# Patient Record
Sex: FEMALE | Race: BLACK OR AFRICAN AMERICAN | Employment: FULL TIME | URBAN - METROPOLITAN AREA
[De-identification: names, ages, dates, MRNs, and addresses within clinical notes are randomized per-mention and may not be internally consistent; named-entity substitution may affect disease eponyms.]

---

## 2023-07-18 ENCOUNTER — OFFICE VISIT (OUTPATIENT)
Dept: PERINATAL CARE | Facility: HOSPITAL | Age: 38
End: 2023-07-18
Attending: OBSTETRICS & GYNECOLOGY
Payer: COMMERCIAL

## 2023-07-18 VITALS — DIASTOLIC BLOOD PRESSURE: 87 MMHG | SYSTOLIC BLOOD PRESSURE: 123 MMHG | HEART RATE: 64 BPM

## 2023-07-18 DIAGNOSIS — N88.3 INCOMPETENT CERVIX: Primary | ICD-10-CM

## 2023-07-18 DIAGNOSIS — Z31.69 ENCOUNTER FOR PRECONCEPTION CONSULTATION: ICD-10-CM

## 2023-07-18 RX ORDER — CHOLECALCIFEROL (VITAMIN D3) 25 MCG
1 TABLET,CHEWABLE ORAL DAILY
COMMUNITY

## 2023-07-18 NOTE — PROGRESS NOTES
Outpatient Maternal-Fetal Medicine Consultation    Dear Dr. Anabell Suazo,    Thank you for requesting maternal fetal medicine consultation on your patient Linda Andres. As you are aware she is a 41year old female with a history of fetal loss due to incompetent cervix and her most recent pregnancy. A maternal-fetal medicine consultation was requested secondary to her poor OB history. Her prenatal records and labs were reviewed. HISTORY  OB History     T0    L0    SAB2  IAB2  Ectopic0  Multiple0  Live Births1   # 1 - Date: 03, Sex: None, Weight: None, GA: 7w0d, Delivery: Induced , Apgar1: None, Apgar5: None, Living: None, Birth Comments: Medical termination of pregnancy due to hyperemesis    # 2 - Date: 08, Sex: None, Weight: None, GA: 8w0d, Delivery: Induced , Apgar1: None, Apgar5: None, Living: None, Birth Comments: Medical termination of pregnancy due to hyperemesis    # 3 - Date: 2013, Sex: None, Weight: None, GA: 12w0d, Delivery: Spontaneous , Delene Tyrrell: None, Apgar5: None, Living: None, Birth Comments: None    # 4 - Date: 21, Sex: None, Weight: None, GA: 9w0d, Delivery: None, Apgar1: None, Apgar5: None, Living: None, Birth Comments: Missed  with a crown-rump length of 8 weeks. Diagnosed at 9 weeks 5 days. # 5 - Date: 23, Sex: Female, Weight: None, GA: 21w6d, Delivery: Normal spontaneous vaginal delivery, Apgar1: None, Apgar5: None, Living:  Demise, Birth Comments: Incompetent cervix    # 6 - Date: None, Sex: None, Weight: None, GA: None, Delivery: None, Apgar1: None, Apgar5: None, Living: None, Birth Comments: None    Past Medical History  The patient  has a past medical history of BMI 30.0-30.9,adult. Past Surgical History  The patient  has a past surgical history that includes d&c after delivery (2023).  - D&C with missed AB    Family History  The patient She indicated that the status of her mother is unknown.  She indicated that her maternal aunt is alive. Medications:   Current Outpatient Medications:     prenatal vitamin with DHA 27-0.8-228 MG Oral Cap, Take 1 capsule by mouth daily. , Disp: , Rfl:     Calcium 250 MG Oral Cap, Take by mouth., Disp: , Rfl:     Cholecalciferol 125 MCG (5000 UT) Oral Tab, Take 1 tablet (5,000 Units total) by mouth daily. , Disp: , Rfl:   Allergies: No Known Allergies      PHYSICAL EXAMINATION:  /87 (BP Location: Right arm, Patient Position: Sitting, Cuff Size: large)   Pulse 64   General: alert and oriented,no acute distress      DISCUSSION  During her visit we discussed and reviewed the following issues:  [facility]  History  She had a history of pregnancy termination x2 for severe hyperemesis. She also had a history of a missed AB around 9 weeks gestation. She reports a history of a home delivery after about 3 months gestation. 23 -vaginal delivery of her daughter Lori Flower who was born at 24+ week.  demise. She had seen [facility] in that pregnancy for level 2 ultrasound on 3/20/23. The fetal anatomy was normal but it was noted that she had a short funneling cervix with debris noted in the fundal.  The measurable cervical length was 9 mm. She was started on vaginal Crinone 90 mg nightly. She was evaluated in labor and delivery on 2023 complaining of contractions. It was reported that there were no contractions on the monitor and her sterile vaginal exam was fingertip thick and -3 station they are short for she was discharged home. Later that night she presented with contractions and vaginal bleeding and was admitted. Her cervical exam at that time was a bulging bag and they were not able to reach behind the back to feel the cervical dilatation. Bedside ultrasound was performed did not find that the fetus was in a footling breech position with hourglassing membranes and the internal os was dilated 1.43 cm.   She was kenn every 2 to 3 minutes and was treated with expectant management based on her gestational age and eminent delivery. Prior in the pregnancy she had had significant hyperemesis and had multiple hospitalization and IV infusions. Discussion:  Discussed with the patient that her presentation with the short funneling cervix and later progression into labor is consistent with an incompetent cervix. We discussed different treatment methods for incompetent cervix. Specifically we discussed prophylactic vaginal Bell cerclage between 12 and 14 weeks, serial cervical length ultrasound and ultrasound indicated placement of cervical cerclage, and we discussed prepregnancy placement of an abdominal cerclage. We discussed success rates of each of these treatment plans as well as risks. Cervical cerclage refers to a variety of surgical procedures in which sutures, wires, or synthetic tape are used to reinforce the cervix. These procedures are intended to mechanically increase the tensile strength of the cervix, thereby reducing the occurrence of adverse  events associated with cervical insufficiency (ie, relatively painless cervical changes that occur in the second trimester and result in recurrent pregnancy loss). Not all women are good candidates for cervical cerclage; the major contraindications are fetal anomaly incompatible with life, intrauterine infection, active bleeding, active  labor, premature rupture of membranes, and fetal demise. The presence of fetal membranes prolapsing through the external cervical os is a relative contraindication to the procedure because the risk of iatrogenic rupture of the membranes in this setting may exceed 50 percent.   Several techniques (eg, Trendelenburg position, back-filling the maternal bladder, decompression amniocentesis) have been used to make the fetal membranes retract prior to cerclage placement, with variable results     The lower and upper limits of gestational age for performing the procedure are not well defined. Waiting at least until the end of the first trimester also permits evaluation for some fetal anomalies, first trimester aneuploidy screening, or chorionic villus sampling prior to the procedure, if indicated. Most cerclages are placed via a vaginal approach. The transabdominal approach is more invasive, but allows higher placement since the transabdominal cerclage can be placed at the cervicoisthmic portion of the uterus, while transvaginal cerclages often end up distal to the internal os. In either case, the object is to reinforce the cervix at the level of the internal os. Lengthening of the cervix is a secondary effect. The length of cervix distal to the cerclage (ie, cerclage to external os) may not be an important factor in determining subsequent pregnancy outcome, but the length of closed cervix proximal to the cerclage appears to be predictive: a length ? 10 mm is desirable     Cervical cerclage is the conventional treatment for cervical insufficiency, despite the paucity of data from randomized trials proving its efficacy. Most case series report a viable birth rate of 70 to 90 percent after elective cerclage, as compared with 10 to 30 percent prior to the procedure. However, using patients as their own controls (ie, pregnancy success rate is compared before and after cerclage) is subject to bias since changes in the patient and her management other than cerclage may have accounted for the higher rate of success in the subsequent pregnancy. In fact, trials in which women were randomly assigned to undergo cerclage or no cerclage report much higher live birth rates in the untreated group than observed in historic controls. The timing of cerclage also affects outcome. There is universal agreement that emergency cerclage performed in the presence of advanced cervical changes and prolapsed membranes has the worst outcome.  It should be appreciated that there is significant risk that an emergency procedure may convert a previable birth to a very low birthweight premature birth (25 to 32 weeks) with the potential for serious long-term neurodevelopmental disability. Transabdominal placement of a cerclage at the cervicoisthmic junction appears to be a safe and effective procedure for reducing the incidence of spontaneous pregnancy loss in selected patients with cervical insufficiency. Potential advantages of transabdominal over transvaginal cerclage are more proximal placement of the stitch (at the level of the internal os), decreased risk of suture migration, absence of a foreign body in the vagina that could promote infection, and the ability to leave the suture in place for future pregnancies. A disadvantage of this approach is the potential need for two laparotomies during pregnancy (one to place the cerclage and potentially another to remove it). There are no studies comparing the outcome of transabdominal and transvaginal cerclage in similar populations of patients. Transabdominal cerclage is a more morbid procedure than transvaginal cerclage. It usually requires a laparotomy for placement and delivery by . For these reasons, most experts recommend reserving the transabdominal approach for women with cervical insufficiency who have either failed two or more previous transvaginal cerclages or in whom a transvaginal cerclage is technically impossible to perform due to extreme shortening, scarring, or laceration of the cervix. Whether to remove the cerclage if PPROM occurs is a matter of debate. One concern is that removal of the cerclage will lead to earlier delivery; however, retention of the foreign body may increase the risk of infectious morbidity. Several observational studies have addressed the management of such patients, with inconsistent results. The gestational age at PPROM was the most important determinant of  outcome.  Based on the available, limited data and our own clinical experience, we remove the cerclage in women with PPROM if (1) there is any evidence of chorioamnionitis or (2) the pregnancy is at least 32 weeks of gestation. In the absence of clinically apparent infection, we feel the possible increased risk of premature delivery with cerclage removal before 32 weeks outweighs the possible increased risk of ascending infection if the cerclage is left in place. After our discussion Cindy call me that she is leaning towards having a vaginal cerclage placed after the first trimester is complete. ADVANCED MATERNAL AGE    Background  I reviewed with the patient that pregnancies in women of advanced maternal age (28 or older at delivery) are associated with elevated risks. Specifically, there is a higher rate of:  Fetal malformations  Preeclampsia  Gestational diabetes  Intrauterine fetal death    As a result, enhanced pregnancy surveillance is advised for these patients including a comprehensive ultrasound to assess for fetal malformations (at 20 weeks) and a third trimester ultrasound assessment for fetal growth (at 32 weeks). In addition, weekly NST's (initiating at 36 weeks gestation for women 35-39 years and at 28 weeks gestation for women 40 years and older) are also advised. Routine obstetric care is more than adequate to assess for gestational diabetes and preeclampsia; hence, no further significant alterations in obstetric care are advised. Medical Complications    Women 28years of age or older can expect to experience two to three fold higher rates of hospitalization,  delivery, and pregnancy-related complications when compared to their younger counterparts. The two most common medical problems complicating these  pregnanccies are hypertension and diabetes.    The incidence of preeclampsia in the general obstetric population is 3 to 4 percent; this increases to 5 to 10 percent in women over age 36 and is as high as 28 percent in women over age 48. The incidence of gestational diabetes in the general obstetric population is 3 percent, rising to 7 to 15 percent in women over age 36 and 21 percent in women over age 48. Women 28years of age or older are more likely to be delivered by . The  delivery rate in the general obstetric population of the [facility] is almost 30 percent, compared to almost 48 percent in women over age 36 to 39 and almost [de-identified] percent in women age 48 to 61. Fetal Death        A decision analysis tool using data from the [employer]  [employer] predicted a strategy of weekly antepartum testing and labor induction would lower the risk of unexplained fetal death in women 28years of age or older. In this model, weekly testing starting at 36 weeks of gestation would drop the risk of fetal death from 5.2 to 1.3 per 1000 pregnancies. While a policy of antepartum testing in older women does increase the chance that a women will be induced (71 inductions per fetal death averted) and thereby increases her risk of having a  delivery, only 14 additional cesareans would need to be performed to avert one unexplained fetal death. Hence, weekly NST's are advised for women of advanced maternal age; testing should be initiated at 42 weeks for women 35-39 years and at 26 weeks for women 36 years and older. Fetal Malformations    Cardiac malformations, clubfoot, and diaphragmatic hernia appear to occur with increased frequency in offspring of older women. These abnormalities are structural and unrelated to aneuploidy, thus they would not be detected by karyotype analysis. For these reasons a complete, detailed ultrasound (level II) is advised even if the fetus has a normal karyotype. Fetal Aneuploidy  We also discussed the increased risk of chromosomal abnormalities associated with advanced maternal age.  I reviewed that an ultrasound examination cannot reliably exclude potential genetic abnormalities. Invasive Testing  I offered invasive genetic testing (amniocentesis, chorionic villus sampling) after reviewing the diagnostic accuracy of these tests as well as the procedure associated loss rate (1:500 for genetic amniocentesis). She ultimately does not desire invasive genetic testing. Non-invasive Pregnancy Testing (NIPT)  I reviewed current non-invasive screening options. Currently non-invasive pregnancy testing (NIPT) offers the highest detection rate (with the lowest false positive rate) for the detection of fetal aneuploidy amongst high-risk patients. The limitations of detailed mid-trimester sonography was reviewed with the patient. First trimester screening and second trimester multiple-marker serum serum screening as alternative aneuploidy screening options were also reviewed. However, both of these tests are associated with lower detection and higher false positive rates. High BMI -she is currently trying to modify her lifestyle to improve her diet and to focus on movement. She does like to walk and generally take walks if the weather is good. Since her delivery, she has reduced fried foods. She has not had red meat in a number of years and rarely eats poultry or other meat products. She is interested in tries to learn about nutrition. We discussed briefly different macronutrients and the importance of adequate protein. We discussed good plant-based sources of protein as well as dairy and egg sources. She does not eat much in the way of cheese and she does not eat seafood of any sort. We discussed the current recommendations for limited gestational weight gain in pregnancy for overweight and obese women. The [employer] currently recommends that women keep gestational weight gain to between 8-18 lbs.   We discussed the role of mild to moderate exercise, healthy food choices and appropriate portions sized to help achieve this goal. Excess weight gain is associated with higher rates of gestational diabetes, hypertensive complications, fetal macrosomia and delivery complications. Women with weight loss or insufficient weight gain have higher rates of small for gestational age infants. Jaida March had her questions answered to her satisfaction. She seems optimistic about a successful pregnancy with a cervical cerclage in the future. IMPRESSION:  Incompetent cervix based on her recent obstetrical history  History of hyperemesis  Advanced maternal age  Class I obesity  History of 2 prior D&Cs    RECOMMENDATIONS:  Continue care with Dr. Fran Mayo  In her next pregnancy, plan on sending her to [facility] by 12 weeks for an NT ultrasound and planning of her cervical cerclage. I would strongly advise that she follow-up with [facility] for cervical length in the second trimester, begin nightly vaginal progesterone 200 mg capsule per night starting at 16 weeks, and routine advanced maternal age care    Total time spent 60 minutes this calendar day which includes preparing to see the patient including chart review, obtaining and/or reviewing additional medical history, performing a physical exam and evaluation, documenting clinical information in the electronic medical record, independently interpreting results, counseling the patient, communicating results to the patient/family/caregiver and coordinating care. Case discussed with patient who demonstrated understanding and agreement with plan. Thank you for allowing me to participate in the care of this patient. Please feel free to contact me with any questions. Kane Paige MD  Maternal-Fetal Medicine       Note to patient and family:  The [id number] makes medical notes available to patients in the interest of transparency. However, please be advised that this is a medical document. It is intended as a peer to peer communication.   It is written in medical language and may contain abbreviations or verbiage that are technical and unfamiliar. It may appear blunt or direct. Medical documents are intended to carry relevant information, facts as evident, and the clinical opinion of the practitioner.

## 2024-05-20 LAB
ANTIBODY SCREEN OB: NEGATIVE
HEPATITIS B SURFACE ANTIGEN OB: NEGATIVE
HIV RESULT OB: NEGATIVE
RAPID PLASMA REAGIN OB: NONREACTIVE
RH FACTOR OB: POSITIVE

## 2024-06-17 ENCOUNTER — ULTRASOUND ENCOUNTER (OUTPATIENT)
Dept: PERINATAL CARE | Facility: HOSPITAL | Age: 39
End: 2024-06-17
Attending: OBSTETRICS & GYNECOLOGY
Payer: COMMERCIAL

## 2024-06-17 VITALS
WEIGHT: 173 LBS | SYSTOLIC BLOOD PRESSURE: 107 MMHG | DIASTOLIC BLOOD PRESSURE: 73 MMHG | BODY MASS INDEX: 27.8 KG/M2 | HEIGHT: 66 IN | HEART RATE: 85 BPM

## 2024-06-17 DIAGNOSIS — N88.3 INCOMPETENT CERVIX: ICD-10-CM

## 2024-06-17 DIAGNOSIS — O09.521 AMA (ADVANCED MATERNAL AGE) MULTIGRAVIDA 35+, FIRST TRIMESTER (HCC): ICD-10-CM

## 2024-06-17 DIAGNOSIS — O34.30: ICD-10-CM

## 2024-06-17 DIAGNOSIS — N88.3 INCOMPETENT CERVIX: Primary | ICD-10-CM

## 2024-06-17 PROCEDURE — 76813 OB US NUCHAL MEAS 1 GEST: CPT | Performed by: OBSTETRICS & GYNECOLOGY

## 2024-06-17 PROCEDURE — 76801 OB US < 14 WKS SINGLE FETUS: CPT | Performed by: OBSTETRICS & GYNECOLOGY

## 2024-06-17 RX ORDER — PROGESTERONE 100 MG/1
100 CAPSULE ORAL NIGHTLY
COMMUNITY

## 2024-06-17 RX ORDER — ONDANSETRON 4 MG/1
4 TABLET, ORALLY DISINTEGRATING ORAL EVERY 8 HOURS PRN
COMMUNITY

## 2024-06-17 NOTE — PROGRESS NOTES
Outpatient Maternal-Fetal Medicine Consultation    Dear Dr. Kim,    Thank you for requesting maternal fetal medicine consultation and ultrasound on your patient Cindy Mcmillan.  As you are aware she is a 39 year old female with a history of fetal loss due to incompetent cervix and her most recent pregnancy.  A maternal-fetal medicine consultation was requested secondary to her poor OB history and AMA.  Her prenatal records and labs were reviewed.    HISTORY  OB History    Para Term  AB Living   6 1 0 1 4 0   SAB IAB Ectopic Multiple Live Births   2 2 0 0 1     # 1 - Date: 03, Sex: None, Weight: None, GA: 7w0d, Type: Induced , Apgar1: None, Apgar5: None, Living: None, Birth Comments: Medical termination of pregnancy due to hyperemesis    # 2 - Date: 08, Sex: None, Weight: None, GA: 8w0d, Type: Induced , Apgar1: None, Apgar5: None, Living: None, Birth Comments: Medical termination of pregnancy due to hyperemesis    # 3 - Date: 2013, Sex: None, Weight: None, GA: 12w0d, Type: Spontaneous , Apgar1: None, Apgar5: None, Living: None, Birth Comments: None    # 4 - Date: 21, Sex: None, Weight: None, GA: 9w0d, Type: None, Apgar1: None, Apgar5: None, Living: None, Birth Comments: Missed  with a crown-rump length of 8 weeks.  Diagnosed at 9 weeks 5 days.    # 5 - Date: 23, Sex: Female, Weight: None, GA: 21w6d, Type: Normal spontaneous vaginal delivery, Apgar1: None, Apgar5: None, Living:  Demise, Birth Comments: Incompetent cervix    # 6 - Date: None, Sex: None, Weight: None, GA: None, Type: None, Apgar1: None, Apgar5: None, Living: None, Birth Comments: None    Past Medical History  The patient  has a past medical history of BMI 30.0-30.9,adult and Incompetent cervix (2023).    Past Surgical History  The patient  has a past surgical history that includes d&c after delivery (2023).   - D&C with missed AB    Family History  The  patient She indicated that the status of her mother is unknown. She indicated that her maternal aunt is alive.      Medications:   Current Outpatient Medications:     prenatal vitamin with DHA 27-0.8-228 MG Oral Cap, Take 1 capsule by mouth daily., Disp: , Rfl:     Calcium 250 MG Oral Cap, Take by mouth., Disp: , Rfl:     Cholecalciferol 125 MCG (5000 UT) Oral Tab, Take 1 tablet (5,000 Units total) by mouth daily., Disp: , Rfl:   Allergies: No Known Allergies      PHYSICAL EXAMINATION:  /73 (BP Location: Right arm, Patient Position: Sitting, Cuff Size: adult)   Pulse 85   Ht 5' 6\" (1.676 m)   Wt 173 lb (78.5 kg)   BMI 27.92 kg/m²             Abdomen:  soft, nontender, no contractions          extremities:  nontender, no edema          Neuro:  unremarkable        DISCUSSION  During her visit we discussed and reviewed the following issues:  ADVANCED MATERNAL AGE    Background  I reviewed with the patient that pregnancies in women of advanced maternal age (35 or older at delivery) are associated with elevated risks. Specifically, there is a higher rate of:  Fetal malformations  Preeclampsia  Gestational diabetes  Intrauterine fetal death    As a result, enhanced pregnancy surveillance is advised for these patients including a comprehensive ultrasound to assess for fetal malformations (at 20 weeks) and a third trimester ultrasound assessment for fetal growth (at 32 weeks). In addition, weekly NST's (initiating at 36 weeks gestation for women 35-39 years and at 32 weeks gestation for women 40 years and older) are also advised. Routine obstetric care is more than adequate to assess for gestational diabetes and preeclampsia; hence, no further significant alterations in obstetric care are advised.    Medical Complications    Women 35 years of age or older can expect to experience two to three fold higher rates of hospitalization,  delivery, and pregnancy-related complications when compared to their younger  counterparts.  The two most common medical problems complicating these  pregnanccies are hypertension and diabetes.   The incidence of preeclampsia in the general obstetric population is 3 to 4 percent; this increases to 5 to 10 percent in women over age 40 and is as high as 35 percent in women over age 50.   The incidence of gestational diabetes in the general obstetric population is 3 percent, rising to 7 to 12 percent in women over age 40 and 20 percent in women over age 50.  Women 35 years of age or older are more likely to be delivered by . The  delivery rate in the general obstetric population of the [place] is almost 30 percent, compared to almost 50 percent in women over age 40 to 45 and almost 80 percent in women age 50 to 63.          Fetal Death        A decision analysis tool using data from the [employer]  [employer] predicted a strategy of weekly antepartum testing and labor induction would lower the risk of unexplained fetal death in women 35 years of age or older. In this model, weekly testing starting at 36 weeks of gestation would drop the risk of fetal death from 5.2 to 1.3 per 1000 pregnancies. While a policy of antepartum testing in older women does increase the chance that a women will be induced (71 inductions per fetal death averted) and thereby increases her risk of having a  delivery, only 14 additional cesareans would need to be performed to avert one unexplained fetal death.  Hence, weekly NST's are advised for women of advanced maternal age; testing should be initiated at 36 weeks for women 35-39 years and at 32 weeks for women 40 years and older.    Fetal Malformations    Cardiac malformations, clubfoot, and diaphragmatic hernia appear to occur with increased frequency in offspring of older women. These abnormalities are structural and unrelated to aneuploidy, thus they would not be detected by karyotype analysis.  For these reasons a  complete, detailed ultrasound (level II) is advised even if the fetus has a normal karyotype.      Fetal Aneuploidy      Invasive Testing  I offered invasive genetic testing (amniocentesis, chorionic villus sampling) after reviewing the diagnostic accuracy of these tests as well as the procedure associated loss rate (1:500 for genetic amniocentesis).        We discussed  the increased risk of chromosomal abnormalities associated with advanced maternal age at age 39 year old. She understands that ultrasound exam cannot exclude potential genetic abnormalities.  Her estimated risk based on maternal age at amniocentesis with any chromosome abnormality is about 1:60  and with Down Syndrome is about 1:110 .   We also discussed the risks and benefits of having  genetic testing ([facility] and amniocentesis) performed.      Non-invasive Pregnancy Testing (NIPT)  I reviewed current non-invasive screening options. Currently non-invasive pregnancy testing (NIPT) offers the highest detection rate (with the lowest false positive rate) for the detection of fetal aneuploidy amongst high-risk patients. The limitations of detailed mid-trimester sonography was reviewed with the patient. First trimester screening and second trimester multiple-marker serum serum screening as alternative aneuploidy screening options were also reviewed. However, both of these tests are associated with lower detection and higher false positive rates.    =================      PRIOR  BIRTH  History  She had a history of pregnancy termination x2 for severe hyperemesis.  She also had a history of a missed AB around 9 weeks gestation.  She reports a history of a home delivery after about 3 months gestation.    23 -vaginal delivery of her daughter Mike who was born at 21+ week.   demise.  She had seen [facility] in that pregnancy for level 2 ultrasound on 3/20/23.  The fetal anatomy was normal but it was noted that she had a short funneling cervix with  debris noted in the fundal.  The measurable cervical length was 9 mm.  She was started on vaginal Crinone 90 mg nightly.  She was evaluated in labor and delivery on 2023 complaining of contractions.  It was reported that there were no contractions on the monitor and her sterile vaginal exam was fingertip thick and -3 station they are short for she was discharged home.  Later that night she presented with contractions and vaginal bleeding and was admitted.  Her cervical exam at that time was a bulging bag and they were not able to reach behind the back to feel the cervical dilatation.  Bedside ultrasound was performed did not find that the fetus was in a footling breech position with hourglassing membranes and the internal os was dilated 1.43 cm.  She was kenn every 2 to 3 minutes and was treated with expectant management based on her gestational age and eminent delivery.    Prior in the pregnancy she had had significant hyperemesis and had multiple hospitalization and IV infusions.    Discussion:  Discussed with the patient that her presentation with the short funneling cervix and later progression into labor is consistent with an incompetent cervix.  We discussed different treatment methods for incompetent cervix.  Specifically we discussed prophylactic vaginal Bell cerclage between 12 and 14 weeks, serial cervical length ultrasound and ultrasound indicated placement of cervical cerclage, and we discussed prepregnancy placement of an abdominal cerclage.  We discussed success rates of each of these treatment plans as well as risks.    Cervical cerclage refers to a variety of surgical procedures in which sutures, wires, or synthetic tape are used to reinforce the cervix. These procedures are intended to mechanically increase the tensile strength of the cervix, thereby reducing the occurrence of adverse  events associated with cervical insufficiency (ie, relatively painless cervical changes  that occur in the second trimester and result in recurrent pregnancy loss).     Not all women are good candidates for cervical cerclage; the major contraindications are fetal anomaly incompatible with life, intrauterine infection, active bleeding, active  labor, premature rupture of membranes, and fetal demise. The presence of fetal membranes prolapsing through the external cervical os is a relative contraindication to the procedure because the risk of iatrogenic rupture of the membranes in this setting may exceed 50 percent.  Several techniques (eg, Trendelenburg position, back-filling the maternal bladder, decompression amniocentesis) have been used to make the fetal membranes retract prior to cerclage placement, with variable results     The lower and upper limits of gestational age for performing the procedure are not well defined. Waiting at least until the end of the first trimester also permits evaluation for some fetal anomalies, first trimester aneuploidy screening, or chorionic villus sampling prior to the procedure, if indicated.    Most cerclages are placed via a vaginal approach. The transabdominal approach is more invasive, but allows higher placement since the transabdominal cerclage can be placed at the cervicoisthmic portion of the uterus, while transvaginal cerclages often end up distal to the internal os. In either case, the object is to reinforce the cervix at the level of the internal os. Lengthening of the cervix is a secondary effect. The length of cervix distal to the cerclage (ie, cerclage to external os) may not be an important factor in determining subsequent pregnancy outcome, but the length of closed cervix proximal to the cerclage appears to be predictive: a length ?10 mm is desirable     Cervical cerclage is the conventional treatment for cervical insufficiency, despite the paucity of data from randomized trials proving its efficacy. Most case series report a viable birth rate  of 70 to 90 percent after elective cerclage, as compared with 10 to 30 percent prior to the procedure. However, using patients as their own controls (ie, pregnancy success rate is compared before and after cerclage) is subject to bias since changes in the patient and her management other than cerclage may have accounted for the higher rate of success in the subsequent pregnancy. In fact, trials in which women were randomly assigned to undergo cerclage or no cerclage report much higher live birth rates in the untreated group than observed in historic controls.    The timing of cerclage also affects outcome. There is universal agreement that emergency cerclage performed in the presence of advanced cervical changes and prolapsed membranes has the worst outcome. It should be appreciated that there is significant risk that an emergency procedure may convert a previable birth to a very low birthweight premature birth (24 to 27 weeks) with the potential for serious long-term neurodevelopmental disability.    Transabdominal placement of a cerclage at the cervicoisthmic junction appears to be a safe and effective procedure for reducing the incidence of spontaneous pregnancy loss in selected patients with cervical insufficiency. Potential advantages of transabdominal over transvaginal cerclage are more proximal placement of the stitch (at the level of the internal os), decreased risk of suture migration, absence of a foreign body in the vagina that could promote infection, and the ability to leave the suture in place for future pregnancies. A disadvantage of this approach is the potential need for two laparotomies during pregnancy (one to place the cerclage and potentially another to remove it).    There are no studies comparing the outcome of transabdominal and transvaginal cerclage in similar populations of patients. Transabdominal cerclage is a more morbid procedure than transvaginal cerclage. It usually requires a  laparotomy for placement and delivery by . For these reasons, most experts recommend reserving the transabdominal approach for women with cervical insufficiency who have either failed two or more previous transvaginal cerclages or in whom a transvaginal cerclage is technically impossible to perform due to extreme shortening, scarring, or laceration of the cervix.    Whether to remove the cerclage if PPROM occurs is a matter of debate. One concern is that removal of the cerclage will lead to earlier delivery; however, retention of the foreign body may increase the risk of infectious morbidity. Several observational studies have addressed the management of such patients, with inconsistent results. The gestational age at PPROM was the most important determinant of  outcome. Based on the available, limited data and our own clinical experience, we remove the cerclage in women with PPROM if (1) there is any evidence of chorioamnionitis or (2) the pregnancy is at least 32 weeks of gestation. In the absence of clinically apparent infection, we feel the possible increased risk of premature delivery with cerclage removal before 32 weeks outweighs the possible increased risk of ascending infection if the cerclage is left in place.    After our discussion Cindy call me that she is leaning towards having a vaginal cerclage placed after the first trimester is complete.        High BMI -she is currently trying to modify her lifestyle to improve her diet and to focus on movement.  She does like to walk and generally take walks if the weather is good.  Since her delivery, she has reduced fried foods.  She has not had red meat in a number of years and rarely eats poultry or other meat products.  She is interested in tries to learn about nutrition.  We discussed briefly different macronutrients and the importance of adequate protein.  We discussed good plant-based sources of protein as well as dairy and egg sources.   She does not eat much in the way of cheese and she does not eat seafood of any sort.    We discussed the current recommendations for limited gestational weight gain in pregnancy for overweight and obese women.  The [place] [employer] currently recommends that women keep gestational weight gain to between 8-18 lbs.  We discussed the role of mild to moderate exercise, healthy food choices and appropriate portions sized to help achieve this goal.  Excess weight gain is associated with higher rates of gestational diabetes, hypertensive complications, fetal macrosomia and delivery complications.  Women with weight loss or insufficient weight gain have higher rates of small for gestational age infants.    Cindy had her questions answered to her satisfaction.  She seems optimistic about a successful pregnancy with a cervical cerclage in the future.          OB ULTRASOUND REPORT   See imaging tab for complete ultrasound report     Fetal Heart Rate: Present 167 bpm  Fetal Presentation: Transverse Left  Amniotic fluid MVP: WNL  Placental Location: Posterior       FIRST TRIMESTER SCREENING:    The CRL is consistent with the gestational age.    Fetus: arms and legs seen suboptimally. Fetal head/skull and abdomen appeared normal. Stomach and bladder seen.   Uterus and adnexa appeared normal      NIPT pending.  IMPRESSION:   1. IUP @  11w2d  2. Scan consistent with dates  3. No fetal structural abnormalities seen but limited by early gestational age  4.  Incompetent cervix based on her recent obstetrical history  5.  Advanced maternal age  6.  History of 2 prior D&Cs    RECOMMENDATIONS:    Weekly NST at 36wks  Growth US at 32wks  Level 2 US at 20wks  Cerclage at 12-14wks ( we will schedule)  F/u one week after cerclage for CL    Total time spent 45 minutes this calendar day which includes preparing to see the patient including chart review, obtaining and/or reviewing additional medical history, performing a physical exam and  evaluation, documenting clinical information in the electronic medical record, independently interpreting results, counseling the patient, communicating results to the patient/family/caregiver and coordinating care.     Case discussed with patient who demonstrated understanding and agreement with plan.     Thank you for allowing me to participate in the care of this patient.  Please feel free to contact me with any questions.    Reymundo Alamo D.O.  Maternal Fetal Medicine        Note to patient and family:  The 21st Century Cures Act makes medical notes available to patients in the interest of transparency.  However, please be advised that this is a medical document.  It is intended as a peer to peer communication.  It is written in medical language and may contain abbreviations or verbiage that are technical and unfamiliar.  It may appear blunt or direct.  Medical documents are intended to carry relevant information, facts as evident, and the clinical opinion of the practitioner.

## 2024-06-21 ENCOUNTER — TELEPHONE (OUTPATIENT)
Dept: OBGYN UNIT | Facility: HOSPITAL | Age: 39
End: 2024-06-21

## 2024-06-25 ENCOUNTER — ANESTHESIA EVENT (OUTPATIENT)
Dept: OBGYN UNIT | Facility: HOSPITAL | Age: 39
End: 2024-06-25

## 2024-06-25 ENCOUNTER — HOSPITAL ENCOUNTER (OUTPATIENT)
Facility: HOSPITAL | Age: 39
Discharge: HOME | End: 2024-06-25
Attending: OBSTETRICS & GYNECOLOGY | Admitting: OBSTETRICS & GYNECOLOGY

## 2024-06-25 ENCOUNTER — ANESTHESIA (OUTPATIENT)
Dept: OBGYN UNIT | Facility: HOSPITAL | Age: 39
End: 2024-06-25

## 2024-06-25 VITALS
SYSTOLIC BLOOD PRESSURE: 110 MMHG | DIASTOLIC BLOOD PRESSURE: 78 MMHG | BODY MASS INDEX: 28 KG/M2 | RESPIRATION RATE: 17 BRPM | OXYGEN SATURATION: 99 % | TEMPERATURE: 98 F | HEART RATE: 65 BPM | WEIGHT: 173 LBS

## 2024-06-25 PROBLEM — Z3A.12 12 WEEKS GESTATION OF PREGNANCY (HCC): Status: ACTIVE | Noted: 2024-06-25

## 2024-06-25 LAB
BASOPHILS # BLD AUTO: 0.01 X10(3) UL (ref 0–0.2)
BASOPHILS NFR BLD AUTO: 0.1 %
EOSINOPHIL # BLD AUTO: 0.05 X10(3) UL (ref 0–0.7)
EOSINOPHIL NFR BLD AUTO: 0.7 %
ERYTHROCYTE [DISTWIDTH] IN BLOOD BY AUTOMATED COUNT: 14.2 %
HCT VFR BLD AUTO: 36.3 %
HGB BLD-MCNC: 12.3 G/DL
IMM GRANULOCYTES # BLD AUTO: 0.03 X10(3) UL (ref 0–1)
IMM GRANULOCYTES NFR BLD: 0.4 %
LYMPHOCYTES # BLD AUTO: 1.5 X10(3) UL (ref 1–4)
LYMPHOCYTES NFR BLD AUTO: 21.3 %
MCH RBC QN AUTO: 27.9 PG (ref 26–34)
MCHC RBC AUTO-ENTMCNC: 33.9 G/DL (ref 31–37)
MCV RBC AUTO: 82.3 FL
MONOCYTES # BLD AUTO: 0.55 X10(3) UL (ref 0.1–1)
MONOCYTES NFR BLD AUTO: 7.8 %
NEUTROPHILS # BLD AUTO: 4.9 X10 (3) UL (ref 1.5–7.7)
NEUTROPHILS # BLD AUTO: 4.9 X10(3) UL (ref 1.5–7.7)
NEUTROPHILS NFR BLD AUTO: 69.7 %
PLATELET # BLD AUTO: 287 10(3)UL (ref 150–450)
RBC # BLD AUTO: 4.41 X10(6)UL
WBC # BLD AUTO: 7 X10(3) UL (ref 4–11)

## 2024-06-25 PROCEDURE — 59320 REVISION OF CERVIX: CPT

## 2024-06-25 PROCEDURE — 0UVC7ZZ RESTRICTION OF CERVIX, VIA NATURAL OR ARTIFICIAL OPENING: ICD-10-PCS | Performed by: OBSTETRICS & GYNECOLOGY

## 2024-06-25 PROCEDURE — 85025 COMPLETE CBC W/AUTO DIFF WBC: CPT | Performed by: OBSTETRICS & GYNECOLOGY

## 2024-06-25 PROCEDURE — 36415 COLL VENOUS BLD VENIPUNCTURE: CPT

## 2024-06-25 RX ORDER — HYDROCODONE BITARTRATE AND ACETAMINOPHEN 5; 325 MG/1; MG/1
1 TABLET ORAL EVERY 6 HOURS PRN
Status: DISCONTINUED | OUTPATIENT
Start: 2024-06-25 | End: 2024-06-25

## 2024-06-25 RX ORDER — ONDANSETRON 2 MG/ML
INJECTION INTRAMUSCULAR; INTRAVENOUS AS NEEDED
Status: DISCONTINUED | OUTPATIENT
Start: 2024-06-25 | End: 2024-06-25 | Stop reason: SURG

## 2024-06-25 RX ORDER — SODIUM CHLORIDE, SODIUM LACTATE, POTASSIUM CHLORIDE, CALCIUM CHLORIDE 600; 310; 30; 20 MG/100ML; MG/100ML; MG/100ML; MG/100ML
INJECTION, SOLUTION INTRAVENOUS CONTINUOUS
Status: DISCONTINUED | OUTPATIENT
Start: 2024-06-25 | End: 2024-06-25

## 2024-06-25 RX ORDER — CITRIC ACID/SODIUM CITRATE 334-500MG
30 SOLUTION, ORAL ORAL ONCE
Status: COMPLETED | OUTPATIENT
Start: 2024-06-25 | End: 2024-06-25

## 2024-06-25 RX ORDER — ACETAMINOPHEN 500 MG
500 TABLET ORAL EVERY 6 HOURS PRN
Status: DISCONTINUED | OUTPATIENT
Start: 2024-06-25 | End: 2024-06-25

## 2024-06-25 RX ORDER — NALBUPHINE HYDROCHLORIDE 10 MG/ML
2.5 INJECTION, SOLUTION INTRAMUSCULAR; INTRAVENOUS; SUBCUTANEOUS
OUTPATIENT
Start: 2024-06-25

## 2024-06-25 RX ORDER — ONDANSETRON 2 MG/ML
4 INJECTION INTRAMUSCULAR; INTRAVENOUS ONCE AS NEEDED
OUTPATIENT
Start: 2024-06-25 | End: 2024-06-25

## 2024-06-25 RX ORDER — BUPIVACAINE HYDROCHLORIDE 7.5 MG/ML
INJECTION, SOLUTION INTRASPINAL AS NEEDED
Status: DISCONTINUED | OUTPATIENT
Start: 2024-06-25 | End: 2024-06-25 | Stop reason: SURG

## 2024-06-25 RX ORDER — SODIUM CHLORIDE, SODIUM LACTATE, POTASSIUM CHLORIDE, CALCIUM CHLORIDE 600; 310; 30; 20 MG/100ML; MG/100ML; MG/100ML; MG/100ML
INJECTION, SOLUTION INTRAVENOUS CONTINUOUS
OUTPATIENT
Start: 2024-06-25

## 2024-06-25 RX ORDER — ACETAMINOPHEN 500 MG
1000 TABLET ORAL EVERY 6 HOURS PRN
Status: DISCONTINUED | OUTPATIENT
Start: 2024-06-25 | End: 2024-06-25

## 2024-06-25 RX ORDER — KETOROLAC TROMETHAMINE 30 MG/ML
30 INJECTION, SOLUTION INTRAMUSCULAR; INTRAVENOUS ONCE
OUTPATIENT
Start: 2024-06-25 | End: 2024-06-25

## 2024-06-25 RX ADMIN — ONDANSETRON 4 MG: 2 INJECTION INTRAMUSCULAR; INTRAVENOUS at 07:40:00

## 2024-06-25 RX ADMIN — SODIUM CHLORIDE, SODIUM LACTATE, POTASSIUM CHLORIDE, CALCIUM CHLORIDE: 600; 310; 30; 20 INJECTION, SOLUTION INTRAVENOUS at 07:34:00

## 2024-06-25 RX ADMIN — BUPIVACAINE HYDROCHLORIDE 1 ML: 7.5 INJECTION, SOLUTION INTRASPINAL at 07:38:00

## 2024-06-25 NOTE — ANESTHESIA POSTPROCEDURE EVALUATION
[facility] Patient Status:  Outpatient   Age/Gender 39 year old female MRN [id number]   Location [facility] LABOR & DELIVERY Attending Martha Oliveira MD   Hosp Day # 0 PCP No primary care provider on file.       Anesthesia Post-op Note    CERCLAGE    Procedure Summary       Date: 06/25/24 Room / Location:  L+D OR 01 /  L+D OR    Anesthesia Start: 0734 Anesthesia Stop: 0813    Procedure: CERCLAGE Diagnosis:     Surgeons: Martha Oliveira MD Anesthesiologist: Renea Hanson MD    Anesthesia Type: spinal ASA Status: 2            Anesthesia Type: spinal    Vitals Value Taken Time   /65 06/25/24 0845   Temp 97.6 °F (36.4 °C) 06/25/24 0811   Pulse 66 06/25/24 0846   Resp 12 06/25/24 0846   SpO2 100 % 06/25/24 0846   Vitals shown include unfiled device data.    Patient Location: PACU    Anesthesia Type: spinal    Airway Patency: patent    Postop Pain Control: adequate    Mental Status: preanesthetic baseline    Cardiopulmonary/Hydration status: stable euvolemic    Complications: no apparent anesthesia related complications

## 2024-06-25 NOTE — ANESTHESIA PREPROCEDURE EVALUATION
PRE-OP EVALUATION    Patient Name: Cindy Mcmillan    Admit Diagnosis: pregnancy    Pre-op Diagnosis: * No pre-op diagnosis entered *    CERCLAGE    Anesthesia Procedure: CERCLAGE    Surgeons and Role:     * Martha Oliveira MD - Primary    Pre-op vitals reviewed.  Temp: 98.3 °F (36.8 °C)  Pulse: 80  Resp: 18  BP: 123/64     Body mass index is 27.92 kg/m².    Current medications reviewed.  Hospital Medications:   lactated ringers infusion   Intravenous Continuous    [COMPLETED] sodium citrate-citric acid (Bicitra) 500-334 MG/5ML oral solution 30 mL  30 mL Oral Once    ceFAZolin (Ancef) 2g in 10mL IV syringe premix  2 g Intravenous Once       Outpatient Medications:     Medications Prior to Admission   Medication Sig Dispense Refill Last Dose    progesterone 100 MG Oral Cap Take 1 capsule (100 mg total) by mouth nightly. Pt taking 3 tabs at bedtime   6/24/2024 at 1730    ondansetron 4 MG Oral Tablet Dispersible Take 1 tablet (4 mg total) by mouth every 8 (eight) hours as needed for Nausea.   6/24/2024 at 2200    prenatal vitamin with DHA 27-0.8-228 MG Oral Cap Take 1 capsule by mouth daily.   6/24/2024 at 0900    Calcium 250 MG Oral Cap Take by mouth.   6/24/2024 at 0900    Cholecalciferol 125 MCG (5000 UT) Oral Tab Take 1 tablet (5,000 Units total) by mouth daily.   6/24/2024 at 0900       Allergies: Patient has no known allergies.      Anesthesia Evaluation    Patient summary reviewed.    Anesthetic Complications  (-) history of anesthetic complications         GI/Hepatic/Renal    Negative GI/hepatic/renal ROS.                             Cardiovascular        Exercise tolerance: good     MET: >4                                           Endo/Other    Negative endo/other ROS.                              Pulmonary    Negative pulmonary ROS.                       Neuro/Psych    Negative neuro/psych ROS.                                  Past Surgical History:   Procedure Laterality Date    D&c after delivery  04/2023     retained placenta    Tonsillectomy  2019     Social History     Socioeconomic History    Marital status:    Tobacco Use    Smoking status: Never    Smokeless tobacco: Never   Substance and Sexual Activity    Alcohol use: Never    Drug use: Never     History   Drug Use Unknown     Available pre-op labs reviewed.  Lab Results   Component Value Date    WBC 7.0 06/25/2024    RBC 4.41 06/25/2024    HGB 12.3 06/25/2024    HCT 36.3 06/25/2024    MCV 82.3 06/25/2024    MCH 27.9 06/25/2024    MCHC 33.9 06/25/2024    RDW 14.2 06/25/2024    .0 06/25/2024               Airway      Mallampati: II  Mouth opening: >3 FB  TM distance: > 6 cm  Neck ROM: full Cardiovascular    Cardiovascular exam normal.         Dental    Dentition appears grossly intact         Pulmonary    Pulmonary exam normal.                 Other findings              ASA: 2   Plan: spinal  NPO status verified and patient meets guidelines.    Post-procedure pain management plan discussed with surgeon and patient.      Plan/risks discussed with: patient                Present on Admission:  **None**

## 2024-06-25 NOTE — ANESTHESIA PROCEDURE NOTES
Spinal Block    Date/Time: 6/25/2024 7:35 AM    Performed by: Renea Hanson MD  Authorized by: Renea Hanson MD      General Information and Staff    Start Time:  6/25/2024 7:35 AM  End Time:  6/25/2024 7:38 AM  Anesthesiologist:  Renea Hanson MD  Performed by:  Anesthesiologist  Patient Location:  OR  Site identification: surface landmarks    Preanesthetic Checklist: patient identified, IV checked, risks and benefits discussed, monitors and equipment checked, pre-op evaluation, timeout performed, anesthesia consent and sterile technique used      Procedure Details    Patient Position:  Sitting  Prep: ChloraPrep    Monitoring:  Cardiac monitor, heart rate and continuous pulse ox  Approach:  Midline  Location:  L3-4  Injection Technique:  Single-shot    Needle    Needle Type:  Sprotte  Needle Gauge:  24 G  Needle Length:  3.5 in    Assessment    Sensory Level:  T8  Events: clear CSF, CSF aspirated, well tolerated and blood negative      Additional Comments

## 2024-06-25 NOTE — OPERATIVE REPORT
Date of surgery:  6/25/24    Preoperative Diagnosis: 1. IUP at 12w3d  2.  H/o Incompetent cervix       Postoperative diagnosis:    Same    Procedure:  Bell Cerclage    Surgeon:  Martha Oliveira M.D.  Estimated Blood loss:   10 ml  Complications:   None  Instrument count:  Correct  Anesthesia:  Spinal    Technique:   After adequate anesthesia was achieved, she was draped and prepped in the usual fashion in a  Dorsal lithomy position.   SVE: closed.  A weighted speculum was placed into the vagina and cervix was exposed.   The cervix was grasped with a ring forcep at 12 O'clock and 6 O'clock positions.  Gentle traction applied.  .  A stitch of number 5 Ethibond was used to place the cerclage in a purse string type manner.  The stitch was tied at the 12 O'clock position.  No complications.         A straight catheter was then used to drain the bladder of  50 ml of clear yellow urine.   She tolerated the procedure well and was returned to her room in good condition.    Discharge home is planned after recovery from her spinal.

## 2024-06-25 NOTE — PROGRESS NOTES
Pt is a 39 year old female admitted to TR5/TRG5-A.     Chief Complaint   Patient presents with    Cerclage     12w3d scheduled cerclage      Pt is  12w3d intra-uterine pregnancy.  History obtained, consents signed. Oriented to room, staff, and plan of care.     EFM tested and applied. Abdomen soft and non-tender. Active fetal movement per patient report.

## 2024-06-25 NOTE — H&P
[facility]  Maternal-Fetal Medicine H&P    Date of Admission:  2024    History of Present Illness:  Cindy Mcmillan is a a(n) 39 year old female.  with an IUP at Gestational Age: 12w3d  Who  present for a prophylactic cerclage due to her h/o incompetent cervix.      Review of History:      OB History:    OB History    Para Term  AB Living   6 1 0 1 4 0   SAB IAB Ectopic Multiple Live Births   2 2 0 0 1      # Outcome Date GA Lbr Jose/2nd Weight Sex Type Anes PTL Lv   6 Current            5  23 21w6d   F NORMAL SPONT  N ND      Birth Comments: Incompetent cervix      Name: [place]   4 21 9w0d             Birth Comments: Missed  with a crown-rump length of 8 weeks.  Diagnosed at 9 weeks 5 days.   3 SAB 2013 12w0d    SAB      2 IAB 08 8w0d    THERAPEUTIC         Birth Comments: Medical termination of pregnancy due to hyperemesis   1 IAB 03 7w0d    THERAPEUTIC         Birth Comments: Medical termination of pregnancy due to hyperemesis       Other Relevant History:  Past Medical History:    BMI 30.0-30.9,adult    Incompetent cervix     Past Surgical History:   Procedure Laterality Date    D&c after delivery  2023    retained placenta    Tonsillectomy  2019     Family History   Problem Relation Age of Onset    Breast Cancer Mother     Diabetes Maternal Aunt     Hypertension Maternal Aunt     Asthma Maternal Aunt       reports that she has never smoked. She has never used smokeless tobacco. She reports that she does not drink alcohol and does not use drugs.    Allergies:  No Known Allergies    Medications:    Current Facility-Administered Medications:     lactated ringers infusion, , Intravenous, Continuous    ceFAZolin (Ancef) 2g in 10mL IV syringe premix, 2 g, Intravenous, Once    Physical examination:  General: Alert, orientated x3.  Cooperative.  No apparent distress.  Vital Signs: /64 (BP Location: Right arm)   Pulse 80    Temp 98.3 °F (36.8 °C) (Oral)   Resp 18   Wt 173 lb (78.5 kg)   BMI 27.92 kg/m²   HEENT: Exam is unremarkable.  Abdomen:  Gravid uterus appropriate for GA Nontender.  Extremities:  No lower extremity edema noted.  Skin: Normal texture and turgor.   bpm      Laboratory Data:  Lab Results   Component Value Date    WBC 7.0 2024    HGB 12.3 2024    HCT 36.3 2024    .0 2024         NARRATIVE:   PREOPERATIVE COUNSELING  I reviewed that cerclage may help reduce the risk of a previable birth or reduce the risk or delay the onset of a  birth.  Cerclage placement is unlikely to reduce  birth which is the result of non-modifiable factors (e.g.: placental abruption, intra-amniotic infection).      I discussed the risks and benefits of cerclage placement  including PROM,  labor, bleeding, infection and although rare, maternal death.  I also reviewed that cerclage removal would be advised in the presence of active  labor, intra-amniotic infection, PROM or significant vaginal bleeding.  I discussed the need for continued monitoring for such  complications.     After counseling, she wishes to proceed with a cerclage.  Admission, and post admission procedures and expectations were discussed in detail.  All questions answered, all appropriate consents have been signed.      IMPRESSION:    IUP at 12w3d  H/o incompetent cervix  AMA    PLAN:    Cervical cerclage placement with anticipate discharge after recovery  Follow up scheduled for next week.  Plan nightly vaginal progesterone starting at 16 weeks      Martha Oliveira MD  2024  7:13 AM

## 2024-06-25 NOTE — DISCHARGE INSTRUCTIONS
Discharge Instructions    Diet: regular  Activity: Normal activity  Restrictions: Nothing in vagina - [place], tampons, douche;No sexual activity      General Instructions    Call your OB doctor if: Fluid leaking from your vagina;Uterine contractions 10 minutes or closer for 1 to 2 hours;Temperature greater than 100F;Vaginal bleeding;Vaginal or rectal pressure

## 2024-07-02 ENCOUNTER — OFFICE VISIT (OUTPATIENT)
Dept: PERINATAL CARE | Facility: HOSPITAL | Age: 39
End: 2024-07-02
Attending: OBSTETRICS & GYNECOLOGY
Payer: COMMERCIAL

## 2024-07-02 VITALS
SYSTOLIC BLOOD PRESSURE: 114 MMHG | DIASTOLIC BLOOD PRESSURE: 69 MMHG | HEIGHT: 66 IN | HEART RATE: 102 BPM | WEIGHT: 170 LBS | BODY MASS INDEX: 27.32 KG/M2

## 2024-07-02 DIAGNOSIS — O34.30: ICD-10-CM

## 2024-07-02 DIAGNOSIS — N88.3 INCOMPETENT CERVIX: ICD-10-CM

## 2024-07-02 DIAGNOSIS — O09.521 AMA (ADVANCED MATERNAL AGE) MULTIGRAVIDA 35+, FIRST TRIMESTER (HCC): ICD-10-CM

## 2024-07-02 DIAGNOSIS — O09.521 AMA (ADVANCED MATERNAL AGE) MULTIGRAVIDA 35+, FIRST TRIMESTER (HCC): Primary | ICD-10-CM

## 2024-07-02 PROCEDURE — 76817 TRANSVAGINAL US OBSTETRIC: CPT | Performed by: OBSTETRICS & GYNECOLOGY

## 2024-07-02 PROCEDURE — 76815 OB US LIMITED FETUS(S): CPT

## 2024-07-02 RX ORDER — PROGESTERONE 200 MG/1
200 CAPSULE ORAL NIGHTLY
Qty: 90 CAPSULE | Refills: 0 | Status: SHIPPED | OUTPATIENT
Start: 2024-07-22

## 2024-07-02 NOTE — PROGRESS NOTES
Outpatient Maternal-Fetal Medicine Follow-Up     Dear Dr. Kim,     Thank you for requesting ultrasound evaluation and maternal fetal medicine consultation on your patient Cindy Mcmillan.  As you are aware she is a 39 year old female  with a Werner pregnancy and an Estimated Date of Delivery: 24.  She returned to maternal-fetal medicine today for a follow-up visit.  Her history was reviewed from her prior visit and there were no interval changes.     Antepartum Risk Factors  Advanced maternal age  History of incompetent cervix, status post cervical cerclage on 2024 (Dr. Oliveira)      S: No pain or problems after the cerclage. We reviewed vaginal progesterone and she would like to start at 15-16 weeks     PHYSICAL EXAMINATION:  /69 (BP Location: Right arm, Patient Position: Sitting, Cuff Size: adult)   Pulse 102   Ht 5' 6\" (1.676 m)   Wt 170 lb (77.1 kg)   Breastfeeding Unknown   BMI 27.44 kg/m²   General: alert and oriented, no acute distress  Abdomen: gravid, soft, non-tender  Extremities: non-tender, no edema     OBSTETRIC ULTRASOUND  The patient had a limited and transvaginal ultrasound today which I interpreted the results and reviewed them with the patient.    Ultrasound Findings:  Single IUP in transverse presentation.    Placenta is anterior.   A 3 vessel cord is noted.  Cardiac activity is present at 158 bpm  MVP is 3.9 cm    The cervix was not able to be clearly visualized and appeared short by transabdominal ultrasound, therefore transvaginal ultrasound was performed. Transvaginal US findings: Cervix is closed, long and no funneling seen measuring 38 mm     See imaging tab for the complete US report.     DISCUSSION  During her visit we discussed and reviewed the following issues:  DISCUSSION  During her visit we discussed and reviewed the following issues:  ADVANCED MATERNAL AGE    I reviewed with the patient that pregnancies in women of advanced maternal age (35 or older  at delivery) are associated with elevated risks. Specifically, there is a higher rate of:  Fetal malformations  Preeclampsia  Gestational diabetes  Intrauterine fetal death     As a result, enhanced pregnancy surveillance is advised for these patients including a comprehensive ultrasound to assess for fetal malformations (at 20 weeks) and a third trimester ultrasound assessment for fetal growth (at 32 weeks). In addition, weekly NST's (initiating at 36 weeks gestation for women 35-39 years and at 32 weeks gestation for women 40 years and older) are also advised. Routine obstetric care is more than adequate to assess for gestational diabetes and preeclampsia; hence, no further significant alterations in obstetric care are advised.  See [facility] note from 6/17/2024 for detailed review.     Fetal Aneuploidy      Invasive Testing  I offered invasive genetic testing (amniocentesis, chorionic villus sampling) after reviewing the diagnostic accuracy of these tests as well as the procedure associated loss rate (1:500 for genetic amniocentesis).        We discussed  the increased risk of chromosomal abnormalities associated with advanced maternal age at age 39 year old. She understands that ultrasound exam cannot exclude potential genetic abnormalities.  Her estimated risk based on maternal age at amniocentesis with any chromosome abnormality is about 1:60  and with Down Syndrome is about 1:110 .   We also discussed the risks and benefits of having  genetic testing ([facility] and amniocentesis) performed.       Non-invasive Pregnancy Testing (NIPT)  I reviewed current non-invasive screening options. Currently non-invasive pregnancy testing (NIPT) offers the highest detection rate (with the lowest false positive rate) for the detection of fetal aneuploidy amongst high-risk patients. The limitations of detailed mid-trimester sonography was reviewed with the patient. First trimester screening and second trimester multiple-marker serum  serum screening as alternative aneuploidy screening options were also reviewed. However, both of these tests are associated with lower detection and higher false positive rates.     =================        PRIOR  BIRTH  History  She had a history of pregnancy termination x2 for severe hyperemesis.  She also had a history of a missed AB around 9 weeks gestation.  She reports a history of a home delivery after about 3 months gestation.     23 -vaginal delivery of her daughter Mike who was born at 21+ week.   demise.  She had seen [facility] in that pregnancy for level 2 ultrasound on 3/20/23.  The fetal anatomy was normal but it was noted that she had a short funneling cervix with debris noted in the fundal.  The measurable cervical length was 9 mm.  She was started on vaginal Crinone 90 mg nightly.  She was evaluated in labor and delivery on 2023 complaining of contractions.  It was reported that there were no contractions on the monitor and her sterile vaginal exam was fingertip thick and -3 station they are short for she was discharged home.  Later that night she presented with contractions and vaginal bleeding and was admitted.  Her cervical exam at that time was a bulging bag and they were not able to reach behind the back to feel the cervical dilatation.  Bedside ultrasound was performed did not find that the fetus was in a footling breech position with hourglassing membranes and the internal os was dilated 1.43 cm.  She was kenn every 2 to 3 minutes and was treated with expectant management based on her gestational age and eminent delivery.     Prior in the pregnancy she had had significant hyperemesis and had multiple hospitalization and IV infusions.    Cerclage -   See prior [facility] notes for detailed review.  We reviewed activity restrictions with cervical cerclage.    We discussed the role of vaginal progesterone with cervical cerclage.  She recalls having this discussion with   Cally prior to the cerclage and she would like to go on this therapy.        High BMI:  Her BMI prior to pregnancy was 28  Obesity/ overweight during pregnancy is associated with numerous maternal and  risks which were discussed previously and reviewed.  We discussed the current recommendations for limited gestational weight gain in pregnancy for overweight and obese women.  The [place] of Medicine currently recommends that women keep gestational weight gain to between 8-18 lbs.  We discussed the role of mild to moderate exercise, healthy food choices and appropriate portions sized to help achieve this goal.  Excess weight gain is associated with higher rates of gestational diabetes, hypertensive complications, fetal macrosomia and delivery complications.  Women with weight loss or insufficient weight gain have higher rates of small for gestational age infants.    See [facility] note from 2024 for detailed review       Cindy had her questions answered to her satisfaction.  She seems optimistic about a successful pregnancy with a cervical cerclage in the future.            IMPRESSION:  IUP at 13w3d  History of incompetent cervix -status post cerclage which is in good position  Advanced maternal age  High BMI     RECOMMENDATIONS:  Continue care with Dr. Kim  Weekly NST at 36 weeks  Fetal growth and BPP at 32 weeks  Limit gestational weight gain to less than 20 pounds  Level 2 ultrasound and cervical length at 20 weeks  Vaginal progesterone 200 mg capsules nightly starting at 16 weeks and continue until cerclage is removed  Plan cerclage removal at 36-37 weeks     Total time spent 30 minutes this calendar day which includes preparing to see the patient including chart review, obtaining and/or reviewing additional medical history, performing a physical exam and evaluation, documenting clinical information in the electronic medical record, independently interpreting results, counseling the patient,  communicating results to the patient/family/caregiver and coordinating care.     Case discussed with patient who demonstrated understanding and agreement with plan.     Thank you for allowing me to participate in the care of this patient.  Please feel free to contact me with any questions.    Martha Oliveira MD  Maternal-Fetal Medicine       Note to patient and family:  The 21st Century Cures Act makes medical notes available to patients in the interest of transparency.  However, please be advised that this is a medical document.  It is intended as a peer to peer communication.  It is written in medical language and may contain abbreviations or verbiage that are technical and unfamiliar.  It may appear blunt or direct.  Medical documents are intended to carry relevant information, facts as evident, and the clinical opinion of the practitioner.

## 2024-08-06 LAB — STREP GP B CULT OB: POSITIVE

## 2024-08-19 ENCOUNTER — OFFICE VISIT (OUTPATIENT)
Dept: PERINATAL CARE | Facility: HOSPITAL | Age: 39
End: 2024-08-19
Attending: OBSTETRICS & GYNECOLOGY
Payer: COMMERCIAL

## 2024-08-19 VITALS
SYSTOLIC BLOOD PRESSURE: 107 MMHG | HEIGHT: 66 IN | WEIGHT: 179 LBS | HEART RATE: 110 BPM | DIASTOLIC BLOOD PRESSURE: 63 MMHG | BODY MASS INDEX: 28.77 KG/M2

## 2024-08-19 DIAGNOSIS — N88.3 INCOMPETENT CERVIX: ICD-10-CM

## 2024-08-19 DIAGNOSIS — O09.522 AMA (ADVANCED MATERNAL AGE) MULTIGRAVIDA 35+, SECOND TRIMESTER (HCC): ICD-10-CM

## 2024-08-19 DIAGNOSIS — O09.521 AMA (ADVANCED MATERNAL AGE) MULTIGRAVIDA 35+, FIRST TRIMESTER (HCC): ICD-10-CM

## 2024-08-19 DIAGNOSIS — O34.32: ICD-10-CM

## 2024-08-19 DIAGNOSIS — O09.521 AMA (ADVANCED MATERNAL AGE) MULTIGRAVIDA 35+, FIRST TRIMESTER (HCC): Primary | ICD-10-CM

## 2024-08-19 PROCEDURE — 76811 OB US DETAILED SNGL FETUS: CPT | Performed by: OBSTETRICS & GYNECOLOGY

## 2024-08-19 PROCEDURE — 76817 TRANSVAGINAL US OBSTETRIC: CPT

## 2024-08-19 NOTE — PROGRESS NOTES
Outpatient Maternal-Fetal Medicine Follow-Up     Dear Dr. Kim,     Thank you for requesting ultrasound evaluation and maternal fetal medicine consultation on your patient Cindy Mcmillan.  As you are aware she is a 39 year old female  with a Werner pregnancy and an Estimated Date of Delivery: 24.  She returned to maternal-fetal medicine today for a follow-up visit.  Her history was reviewed from her prior visit and there were no interval changes.     Antepartum Risk Factors  Advanced maternal age  History of incompetent cervix, status post cervical cerclage on 2024 (Dr. Oliveira)      S: No pain or problems after the cerclage.     PHYSICAL EXAMINATION:  /63 (BP Location: Right arm, Patient Position: Sitting, Cuff Size: adult)   Pulse 110   Ht 5' 6\" (1.676 m)   Wt 179 lb (81.2 kg)   BMI 28.89 kg/m²   General: alert and oriented, no acute distress  Abdomen: gravid, soft, non-tender  Extremities: non-tender, no edema          DISCUSSION  During her visit we discussed and reviewed the following issues:  DISCUSSION  During her visit we discussed and reviewed the following issues:  ADVANCED MATERNAL AGE    I reviewed with the patient that pregnancies in women of advanced maternal age (35 or older at delivery) are associated with elevated risks. Specifically, there is a higher rate of:  Fetal malformations  Preeclampsia  Gestational diabetes  Intrauterine fetal death     As a result, enhanced pregnancy surveillance is advised for these patients including a comprehensive ultrasound to assess for fetal malformations (at 20 weeks) and a third trimester ultrasound assessment for fetal growth (at 32 weeks). In addition, weekly NST's (initiating at 36 weeks gestation for women 35-39 years and at 32 weeks gestation for women 40 years and older) are also advised. Routine obstetric care is more than adequate to assess for gestational diabetes and preeclampsia; hence, no further significant alterations  in obstetric care are advised.  See [facility] note from 2024 for detailed review.     Fetal Aneuploidy      Invasive Testing  I offered invasive genetic testing (amniocentesis, chorionic villus sampling) after reviewing the diagnostic accuracy of these tests as well as the procedure associated loss rate (1:500 for genetic amniocentesis).        We discussed  the increased risk of chromosomal abnormalities associated with advanced maternal age at age 39 year old. She understands that ultrasound exam cannot exclude potential genetic abnormalities.  Her estimated risk based on maternal age at amniocentesis with any chromosome abnormality is about 1:60  and with Down Syndrome is about 1:110 .   We also discussed the risks and benefits of having  genetic testing (CVS and amniocentesis) performed.       Non-invasive Pregnancy Testing (NIPT)  I reviewed current non-invasive screening options. Currently non-invasive pregnancy testing (NIPT) offers the highest detection rate (with the lowest false positive rate) for the detection of fetal aneuploidy amongst high-risk patients. The limitations of detailed mid-trimester sonography was reviewed with the patient. First trimester screening and second trimester multiple-marker serum serum screening as alternative aneuploidy screening options were also reviewed. However, both of these tests are associated with lower detection and higher false positive rates.     =================        PRIOR  BIRTH  History  She had a history of pregnancy termination x2 for severe hyperemesis.  She also had a history of a missed AB around 9 weeks gestation.  She reports a history of a home delivery after about 3 months gestation.     23 -vaginal delivery of her daughter Mike who was born at 21+ week.   demise.  She had seen [facility] in that pregnancy for level 2 ultrasound on 3/20/23.  The fetal anatomy was normal but it was noted that she had a short funneling cervix with debris  noted in the fundal.  The measurable cervical length was 9 mm.  She was started on vaginal Crinone 90 mg nightly.  She was evaluated in labor and delivery on 2023 complaining of contractions.  It was reported that there were no contractions on the monitor and her sterile vaginal exam was fingertip thick and -3 station they are short for she was discharged home.  Later that night she presented with contractions and vaginal bleeding and was admitted.  Her cervical exam at that time was a bulging bag and they were not able to reach behind the back to feel the cervical dilatation.  Bedside ultrasound was performed did not find that the fetus was in a footling breech position with hourglassing membranes and the internal os was dilated 1.43 cm.  She was kenn every 2 to 3 minutes and was treated with expectant management based on her gestational age and eminent delivery.     Prior in the pregnancy she had had significant hyperemesis and had multiple hospitalization and IV infusions.    Cerclage -   See prior [facility] notes for detailed review.  We reviewed activity restrictions with cervical cerclage.    We discussed the role of vaginal progesterone with cervical cerclage.  She recalls having this discussion with Dr. Alamo prior to the cerclage and she would like to go on this therapy.        High BMI:  Her BMI prior to pregnancy was 28  Obesity/ overweight during pregnancy is associated with numerous maternal and  risks which were discussed previously and reviewed.  We discussed the current recommendations for limited gestational weight gain in pregnancy for overweight and obese women.  The [place] of Medicine currently recommends that women keep gestational weight gain to between 8-18 lbs.  We discussed the role of mild to moderate exercise, healthy food choices and appropriate portions sized to help achieve this goal.  Excess weight gain is associated with higher rates of gestational diabetes,  hypertensive complications, fetal macrosomia and delivery complications.  Women with weight loss or insufficient weight gain have higher rates of small for gestational age infants.    See [facility] note from 6/17/2024 for detailed review       Cindy had her questions answered to her satisfaction.  She seems optimistic about a successful pregnancy with a cervical cerclage in the future.         OB ULTRASOUND REPORT   See imaging tab for complete ultrasound report or in PACS    Single IUP in breech presentation.    Placenta is right lateral.   A 3 vessel cord is noted.  Cardiac activity is present at 159 bpm   g ( 0 lb 13 oz);   MVP is 4.2 cm    The cervix was not able to be clearly visualized and appeared short by transabdominal ultrasound, therefore transvaginal ultrasound was performed. Transvaginal US findings: Cervix is closed, long and no funneling seen measuring 34.6 mm   Uterus and adnexa appeared normal today on ultrasound      Fetal Anatomy:  Visualized with normal appearance: head, face, spine, neck, skin, chest, abdominal wall, gastrointestinal tract, kidneys, bladder, extremities.   Brain: Visualized and normal appearances: brain parenchyma, cerebral ventricles, choroid plexus, Cisterna Magna, midline falx, cerebellum, cerebellar lobes, posterior fossa, vermis, cavum septi pellucidi.  Face: eyes normal, profile normal, nose normal, lip normal, palate normal.  Heart: visualized and normal appearance: 3 vessel view, four-chamber, left outflow tract, right outflow tract, arches.      Genetic Sonogram:  Nuchal fold normal.  Pylelectasis absent.  No hyperechogenic bowel.  Echogenic intracardiac foci absent. Nasal bone present. Choroid plexus cyst absent.      Summary of Ultrasound findings:  This is a Werner pregnancy    The fetal measurements are consistent with established EDC. No gross ultrasound evidence of structural abnormalities are seen today. No minor markers for aneuploidy are seen. The patient  understands that ultrasound cannot rule out all structural and chromosomal abnormalities.       IMPRESSION:   1. IUP @  20w2d  2. Scan consistent with dates  3. No fetal structural abnormalities seen  4. History of incompetent cervix -status post cerclage which is in good position  5.  Advanced maternal age  6.  High BMI    RECOMMENDATIONS:     Continue care with Dr. Kim  Weekly NST at 36 weeks  Fetal growth and BPP at 32 weeks  Limit gestational weight gain to less than 20 pounds  Vaginal progesterone 200 mg capsules nightly starting at 16 weeks and continue until cerclage is removed  Plan cerclage removal at 36-37 weeks     Total time spent 30 minutes this calendar day which includes preparing to see the patient including chart review, obtaining and/or reviewing additional medical history, performing a physical exam and evaluation, documenting clinical information in the electronic medical record, independently interpreting results, counseling the patient, communicating results to the patient/family/caregiver and coordinating care.     Case discussed with patient who demonstrated understanding and agreement with plan.     Thank you for allowing me to participate in the care of this patient.  Please feel free to contact me with any questions.    Reymundo Alamo D.O.  Maternal Fetal Medicine        Note to patient and family:  The 21st Century Cures Act makes medical notes available to patients in the interest of transparency.  However, please be advised that this is a medical document.  It is intended as a peer to peer communication.  It is written in medical language and may contain abbreviations or verbiage that are technical and unfamiliar.  It may appear blunt or direct.  Medical documents are intended to carry relevant information, facts as evident, and the clinical opinion of the practitioner.

## 2024-09-10 ENCOUNTER — HOSPITAL ENCOUNTER (OUTPATIENT)
Facility: HOSPITAL | Age: 39
Discharge: HOME | End: 2024-09-10
Attending: OBSTETRICS & GYNECOLOGY | Admitting: OBSTETRICS & GYNECOLOGY
Payer: COMMERCIAL

## 2024-09-10 ENCOUNTER — ULTRASOUND ENCOUNTER (OUTPATIENT)
Dept: PERINATAL CARE | Facility: HOSPITAL | Age: 39
End: 2024-09-10
Attending: OBSTETRICS & GYNECOLOGY
Payer: COMMERCIAL

## 2024-09-10 VITALS
WEIGHT: 180 LBS | TEMPERATURE: 98 F | BODY MASS INDEX: 29.99 KG/M2 | HEIGHT: 65 IN | RESPIRATION RATE: 18 BRPM | HEART RATE: 94 BPM | SYSTOLIC BLOOD PRESSURE: 124 MMHG | DIASTOLIC BLOOD PRESSURE: 67 MMHG

## 2024-09-10 DIAGNOSIS — O34.32 CERVICAL CERCLAGE SUTURE PRESENT IN SECOND TRIMESTER (HCC): Primary | ICD-10-CM

## 2024-09-10 DIAGNOSIS — N88.3 INCOMPETENT CERVIX: ICD-10-CM

## 2024-09-10 LAB
BILIRUBIN URINE: NEGATIVE
BLOOD URINE: NEGATIVE
CONTROL RUN WITHIN 24 HOURS?: YES
GLUCOSE URINE: NEGATIVE
KETONE URINE: NEGATIVE
LEUKOCYTE ESTERASE URINE: NEGATIVE
NITRITE URINE: NEGATIVE
PH URINE: 7 (ref 5–8)
PROTEIN URINE: NEGATIVE
SPEC GRAVITY: 1.01 (ref 1–1.03)
URINE CLARITY: CLEAR
URINE COLOR: YELLOW
UROBILINOGEN URINE: 0.2

## 2024-09-10 PROCEDURE — 81002 URINALYSIS NONAUTO W/O SCOPE: CPT

## 2024-09-10 PROCEDURE — 76817 TRANSVAGINAL US OBSTETRIC: CPT | Performed by: OBSTETRICS & GYNECOLOGY

## 2024-09-10 PROCEDURE — 99214 OFFICE O/P EST MOD 30 MIN: CPT

## 2024-09-10 PROCEDURE — 76815 OB US LIMITED FETUS(S): CPT

## 2024-09-10 NOTE — PROGRESS NOTES
Pt is a 39 year old female admitted to TRG3/TRG3-A.     Chief Complaint   Patient presents with    R/o  Labor     Patient presents with low back pain since yesterday.  Denies bleeding or LOF.  + FM      Pt is  23w3d intra-uterine pregnancy.  History obtained, consents signed. Oriented to room, staff, and plan of care.

## 2024-09-10 NOTE — CONSULTS
[facility]  Maternal-Fetal Medicine Inpatient Consultation    Date of Admission:  9/10/2024  Date of Consult:  9/10/2024    Reason for Consult:   7 out of 10 back pain, history of incompetent cervix with cervical cerclage    History of Present Illness:  Cindy Mcmillan is a a(n) 39 year old female.  with an IUP at Gestational Age: 23w3d    Who  presents with left-sided low back pain that rates a 7 out of 10.  It was present when she woke up and she had a hard time bending or standing initially.  Yesterday she had return of some emesis, 3 bouts that were fairly violent.  She is not certain if that caused her to pull a muscle.  While she was at work today the pain got more intense and she decided to call and come in.  She denies any vaginal spotting or bleeding.  No change in discharge.  She does not report any uterine cramping.  The pain is fairly constant and stabbing-like.  There is no radiation to the pain.    She denies any hematuria or dysuria.  She reports in the past she has had problems with recurrent UTIs during pregnancy.  I reviewed the labs and she had had a urinalysis that was very clean about 2 weeks ago.      Review of History:      OB History:    OB History    Para Term  AB Living   7 1 0 1 4 0   SAB IAB Ectopic Multiple Live Births   2 2 0 0 1      # Outcome Date GA Lbr Jose/2nd Weight Sex Type Anes PTL Lv   7 Current            6  23 21w6d   F NORMAL SPONT  N ND      Birth Comments: Incompetent cervix      Name: Russell   5 21 9w0d             Birth Comments: Missed  with a crown-rump length of 8 weeks.  Diagnosed at 9 weeks 5 days.   4 SAB 2013 12w0d    SAB      3 IAB 08 8w0d    THERAPEUTIC         Birth Comments: Medical termination of pregnancy due to hyperemesis   2 IAB 03 7w0d    THERAPEUTIC         Birth Comments: Medical termination of pregnancy due to hyperemesis   1                 Other Relevant  History:  Past Medical History:    BMI 30.0-30.9,adult    Incompetent cervix     Past Surgical History:   Procedure Laterality Date    D&c after delivery  04/2023    retained placenta    Tonsillectomy  2019     Family History   Problem Relation Age of Onset    Breast Cancer Mother     Diabetes Maternal Aunt     Hypertension Maternal Aunt     Asthma Maternal Aunt       reports that she has never smoked. She has never used smokeless tobacco. She reports that she does not drink alcohol and does not use drugs.    Allergies:  No Known Allergies    Medications:  No current facility-administered medications for this encounter.    Physical Exam:   General: Alert, orientated x3.  Cooperative.  No apparent distress.  Vital Signs: Temp:  [98 °F (36.7 °C)] 98 °F (36.7 °C)  Pulse:  [94] 94  Resp:  [18] 18  BP: (124)/(67) 124/67    HEENT: Exam is unremarkable.  Abdomen:  Gravid uterus appropriate for GA Nontender.  Uterus is soft  Extremities:  No lower extremity edema noted.  Skin: Normal texture and turgor.  No CVA tenderness.  She reports the pain is in the lower left side of her back midline to left.  She points to the area level with the iliac crest and down to the SI joint.  She does report it is tender when I palpate over that area.  There is no radiation to the pain    Laboratory Data:       Fetal Ultrasound:  69421; 61133    Ultrasound Findings:  Single IUP in breech presentation.    Placenta is right lateral.   Cardiac activity is present at 157 bpm  MVP is 6.7 cm    The cervix was not able to be clearly visualized and appeared short by transabdominal ultrasound, therefore transvaginal ultrasound was performed. Transvaginal US findings: Cervix is closed, long and no funneling seen measuring 38.8 mm .     NARRATIVE:   We discussed there are no signs of cervical insufficiency or that the cerclage is not holding.  We discussed that it sounds like she might have some musculoskeletal spasm or even SI joint inflammation.  We  discussed use of topical heat or cold pads, topical muscle pain relief, and Tylenol..    Also explained it is worthwhile to make sure there is no signs of any kidney stone or urinary tract infection as the treatment would be different.    Cindy was relieved to learn that the cervix was fine and agrees that it could be musculoskeletal pain.  I spoke with Dr. Rivera she will complete evaluation for urinary cause and provide follow-up care for the patient.    IMPRESSION:    IUP at 23w3d  History of cervical incompetence -cervical cerclage intact with a normal cervical length.  No signs of cervical insufficiency or labor at this time  Left-sided low back pain probable musculoskeletal would advise ruling out urinary causes    RECOMMENDATIONS:   Obtain a clean-catch urine to evaluate for infection or signs of blood that could indicate a stone  Tylenol for pain as well as ice packs or heating packs as she prefers    Total time spent 35 minutes this calendar day which includes preparing to see the patient including chart review, obtaining and/or reviewing additional medical history, performing a physical exam and evaluation, documenting clinical information in the electronic medical record, independently interpreting results, counseling the patient, communicating results to the patient/family/caregiver and coordinating care.     Case discussed with patient who demonstrated understanding and agreement with plan.     Thank you for allowing me to participate in the care of this patient.  Please feel free to contact me with any questions.    Martha Oliveira MD  Maternal-Fetal Medicine        Martha Oliveira MD  9/10/2024  2:41 PM

## 2024-09-10 NOTE — DISCHARGE INSTRUCTIONS
Discharge Instructions    Diet: regular  Activity: Normal activity  Restrictions: No sexual activity;Nothing in vagina - [place], tampons, douche      General Instructions    Call your OB doctor if: Fluid leaking from your vagina;Uterine contractions 10 minutes or closer for 1 to 2 hours;Uterine contractions increasing in intensity and frequency;Decrease in fetal movement;Vaginal bleeding;Temperature greater than 100F;Vaginal or rectal pressure

## 2024-09-26 DIAGNOSIS — O34.30: ICD-10-CM

## 2024-09-27 RX ORDER — PROGESTERONE 200 MG/1
200 CAPSULE ORAL NIGHTLY
Qty: 90 CAPSULE | Refills: 0 | Status: SHIPPED | OUTPATIENT
Start: 2024-09-27 | End: 2024-12-26

## 2024-10-07 ENCOUNTER — HOSPITAL ENCOUNTER (OUTPATIENT)
Facility: HOSPITAL | Age: 39
Discharge: HOME | End: 2024-10-08
Attending: OBSTETRICS & GYNECOLOGY | Admitting: OBSTETRICS & GYNECOLOGY
Payer: COMMERCIAL

## 2024-10-07 ENCOUNTER — APPOINTMENT (OUTPATIENT)
Dept: ULTRASOUND IMAGING | Facility: HOSPITAL | Age: 39
End: 2024-10-07
Attending: OBSTETRICS & GYNECOLOGY
Payer: COMMERCIAL

## 2024-10-07 VITALS
WEIGHT: 180 LBS | TEMPERATURE: 98 F | RESPIRATION RATE: 16 BRPM | SYSTOLIC BLOOD PRESSURE: 123 MMHG | BODY MASS INDEX: 30 KG/M2 | HEART RATE: 97 BPM | DIASTOLIC BLOOD PRESSURE: 72 MMHG

## 2024-10-07 LAB
APTT PPP: 25.4 SECONDS (ref 23–36)
BASOPHILS # BLD AUTO: 0.02 X10(3) UL (ref 0–0.2)
BASOPHILS NFR BLD AUTO: 0.2 %
EOSINOPHIL # BLD AUTO: 0.13 X10(3) UL (ref 0–0.7)
EOSINOPHIL NFR BLD AUTO: 1.2 %
ERYTHROCYTE [DISTWIDTH] IN BLOOD BY AUTOMATED COUNT: 14.6 %
HCT VFR BLD AUTO: 34.2 %
HGB BLD-MCNC: 11.2 G/DL
IMM GRANULOCYTES # BLD AUTO: 0.25 X10(3) UL (ref 0–1)
IMM GRANULOCYTES NFR BLD: 2.3 %
INR BLD: 0.95 (ref 0.8–1.2)
LYMPHOCYTES # BLD AUTO: 2.1 X10(3) UL (ref 1–4)
LYMPHOCYTES NFR BLD AUTO: 19.5 %
MCH RBC QN AUTO: 28.2 PG (ref 26–34)
MCHC RBC AUTO-ENTMCNC: 32.7 G/DL (ref 31–37)
MCV RBC AUTO: 86.1 FL
MONOCYTES # BLD AUTO: 0.93 X10(3) UL (ref 0.1–1)
MONOCYTES NFR BLD AUTO: 8.6 %
NEUTROPHILS # BLD AUTO: 7.33 X10 (3) UL (ref 1.5–7.7)
NEUTROPHILS # BLD AUTO: 7.33 X10(3) UL (ref 1.5–7.7)
NEUTROPHILS NFR BLD AUTO: 68.2 %
PLATELET # BLD AUTO: 236 10(3)UL (ref 150–450)
PROTHROMBIN TIME: 12.8 SECONDS (ref 11.6–14.8)
RBC # BLD AUTO: 3.97 X10(6)UL
WBC # BLD AUTO: 10.8 X10(3) UL (ref 4–11)

## 2024-10-07 PROCEDURE — 85730 THROMBOPLASTIN TIME PARTIAL: CPT | Performed by: OBSTETRICS & GYNECOLOGY

## 2024-10-07 PROCEDURE — 85460 HEMOGLOBIN FETAL: CPT | Performed by: OBSTETRICS & GYNECOLOGY

## 2024-10-07 PROCEDURE — 85610 PROTHROMBIN TIME: CPT | Performed by: OBSTETRICS & GYNECOLOGY

## 2024-10-07 PROCEDURE — 85025 COMPLETE CBC W/AUTO DIFF WBC: CPT | Performed by: OBSTETRICS & GYNECOLOGY

## 2024-10-07 PROCEDURE — 76815 OB US LIMITED FETUS(S): CPT | Performed by: OBSTETRICS & GYNECOLOGY

## 2024-10-07 PROCEDURE — 36415 COLL VENOUS BLD VENIPUNCTURE: CPT

## 2024-10-08 LAB — KLEIHAUER BETKE RESULT: NEGATIVE

## 2024-10-08 PROCEDURE — 99213 OFFICE O/P EST LOW 20 MIN: CPT

## 2024-10-08 NOTE — NST
Nonstress Test   Patient: Cindy Mcmillan    Gestation: 27w3d    NST:       Variability: Moderate           Accelerations: Yes           Decelerations: None            Baseline: 145 BPM           Uterine Irritability: No           Contractions: Not present                                        Acoustic Stimulator: No           Nonstress Test Interpretation: Reactive                      FHR Category: Category I          Additional Comments

## 2024-10-08 NOTE — PROGRESS NOTES
Pt discharged home, return precautions reviews, copy of AVS provided and pt verbalized understanding. BLAINE schedule 10/17
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
## 2024-10-17 LAB — HIV RESULT OB: NEGATIVE

## 2024-11-06 ENCOUNTER — HOSPITAL ENCOUNTER (OUTPATIENT)
Facility: HOSPITAL | Age: 39
Discharge: HOME | End: 2024-11-06
Attending: OBSTETRICS & GYNECOLOGY | Admitting: OBSTETRICS & GYNECOLOGY
Payer: COMMERCIAL

## 2024-11-06 ENCOUNTER — APPOINTMENT (OUTPATIENT)
Dept: ULTRASOUND IMAGING | Facility: HOSPITAL | Age: 39
End: 2024-11-06
Attending: OBSTETRICS & GYNECOLOGY
Payer: COMMERCIAL

## 2024-11-06 VITALS
WEIGHT: 195 LBS | RESPIRATION RATE: 16 BRPM | DIASTOLIC BLOOD PRESSURE: 64 MMHG | TEMPERATURE: 98 F | HEART RATE: 89 BPM | BODY MASS INDEX: 32.49 KG/M2 | HEIGHT: 65 IN | SYSTOLIC BLOOD PRESSURE: 118 MMHG

## 2024-11-06 PROCEDURE — 99213 OFFICE O/P EST LOW 20 MIN: CPT

## 2024-11-06 PROCEDURE — 76815 OB US LIMITED FETUS(S): CPT | Performed by: OBSTETRICS & GYNECOLOGY

## 2024-11-06 PROCEDURE — 59025 FETAL NON-STRESS TEST: CPT

## 2024-11-06 NOTE — PROGRESS NOTES
Discharged to home per ambulatory in stable condition with written and verbal instructions. Kick counts reviewed. Patient verbalizes understanding of information given.

## 2024-11-06 NOTE — NST
Nonstress Test   Patient: Cindy Mcmillan    Gestation: 31w4d    NST:                   Accelerations: Yes           Decelerations: None            Baseline: 145 BPM           Uterine Irritability: No           Contractions: Irregular                    Contraction Frequency: x2                   Acoustic Stimulator: No           Nonstress Test Interpretation: Appropriate for gestational age           Nonstress Test Second Interpretation: Appropriate for gestational age                     Additional Comments

## 2024-11-06 NOTE — DISCHARGE INSTRUCTIONS
Labor Discharge Instructions    Call your OB doctor if you have any of the following signs:    Period-like cramps that may come and go  Low, dull backache  Pressure in your lower abdomen that may feel like the baby is pushing down  Change in the type or amount of vaginal discharge  Abdominal cramps that may be accompanied by diarrhea  Fluid leaking from your vagina (may or may not be bloody)    Kick Counts  It’s normal to worry about your baby’s health. Generally, you will feel your baby start to move in your 2nd trimester at around 16 to 24 weeks. Getting to know the pattern of your baby's movements is one way to know what's normal for you and baby. This is called a kick count. Talk with your healthcare provider about kick counts and your specific situation. Always follow your provider's instructions.   How to count kicks    Here is just one way to do kick counts. Always follow your healthcare provider's instructions. Starting at 28 weeks, count your baby's movements daily. Time how long it takes you to feel 10 kicks, flutters, swishes, or rolls. Ideally, you want to feel at least 10 movements in 2 hours. You will likely feel 10 movements in less time than that.   Here are tips for counting kicks:   Choose a time when the baby is active, such as after a meal.   Sit comfortably or lie on your side.   The first time the baby moves, write down the time.   Count each movement until the baby has moved  10 times. This can take from 20 minutes to 2 hours.   If you haven't felt 10 kicks by the end of the second hour, wait a few hours. Then try again.  Try to do it at the same time each day.  When to call your healthcare provider  Follow your provider's instructions about when to call about your baby's movements. Don't hesitate to call if you have concerns.   Call your healthcare provider  right away if:   You do a couple sets of kick counts during the day and your baby moves fewer than 10 times in 2 hours.  Your  baby moves much less often than on the days before.  You haven't felt your baby move all day.  [employer] last reviewed this educational content on 12/1/2022 © 2000-2023 [employer]. All rights reserved. This information is not intended as a substitute for professional medical care. Always follow your healthcare professional's instructions.

## 2024-11-06 NOTE — PROGRESS NOTES
Pt is a 39 year old female admitted to TRG3/TRG3-A.     Chief Complaint   Patient presents with     Assessment     Pt states she has had a increase in FM for the past 4-5 hours on just her left side and during that time frame was experiencing tightness that comes and goes. Since the ride to hospital the movements have calmed and the tightness is no longer there      Pt is  31w4d intra-uterine pregnancy.  History obtained, consents signed. Oriented to room, staff, and plan of care.

## 2024-11-21 ENCOUNTER — HOSPITAL ENCOUNTER (OUTPATIENT)
Facility: HOSPITAL | Age: 39
Discharge: HOME | End: 2024-11-21
Attending: STUDENT IN AN ORGANIZED HEALTH CARE EDUCATION/TRAINING PROGRAM | Admitting: STUDENT IN AN ORGANIZED HEALTH CARE EDUCATION/TRAINING PROGRAM
Payer: COMMERCIAL

## 2024-11-21 ENCOUNTER — ULTRASOUND ENCOUNTER (OUTPATIENT)
Dept: PERINATAL CARE | Facility: HOSPITAL | Age: 39
End: 2024-11-21
Attending: OBSTETRICS & GYNECOLOGY
Payer: COMMERCIAL

## 2024-11-21 VITALS
DIASTOLIC BLOOD PRESSURE: 65 MMHG | BODY MASS INDEX: 32.49 KG/M2 | WEIGHT: 195 LBS | SYSTOLIC BLOOD PRESSURE: 118 MMHG | HEIGHT: 65 IN | RESPIRATION RATE: 18 BRPM | HEART RATE: 85 BPM | TEMPERATURE: 98 F

## 2024-11-21 DIAGNOSIS — O36.8190: Primary | ICD-10-CM

## 2024-11-21 PROCEDURE — 76815 OB US LIMITED FETUS(S): CPT

## 2024-11-21 PROCEDURE — 76818 FETAL BIOPHYS PROFILE W/NST: CPT | Performed by: OBSTETRICS & GYNECOLOGY

## 2024-11-21 PROCEDURE — 99214 OFFICE O/P EST MOD 30 MIN: CPT

## 2024-11-21 PROCEDURE — 59025 FETAL NON-STRESS TEST: CPT

## 2024-11-21 PROCEDURE — 76819 FETAL BIOPHYS PROFIL W/O NST: CPT | Performed by: OBSTETRICS & GYNECOLOGY

## 2024-11-21 RX ORDER — PNV NO.95/FERROUS FUM/FOLIC AC 28MG-0.8MG
325 TABLET ORAL DAILY
COMMUNITY

## 2024-11-21 NOTE — PROGRESS NOTES
Discharge instructions reviewed with pt. Pt verbalizes understanding. Denies questions. PT leaves in stable condition per ambulation.

## 2024-11-21 NOTE — PROGRESS NOTES
EFMs applied, FHTs 145. PT states she has not felt FM for the last 24 hrs. PT states she did \"feel her curl into a ball a couple hrs ago\". Pt denies ctxs, LOF, and VB. Pt has a Hx of recurrent loss and has a cerclage. Pt denies any problems with this pregnancy. Pt has anemia. Denies any other medical problems. Admission assessment initiated at this time.

## 2024-11-21 NOTE — PROGRESS NOTES
L0, 33+5 arrives per ambulation. Pt here for decreased FM. Pt taken to Triage 2 and changing at this time.

## 2024-11-21 NOTE — IMAGING NOTE
Ultrasound Findings:  Single IUP in cephalic presentation.    Placenta is right lateral.   Cardiac activity is present at 157 bpm  MVP is 5 cm . FRANCIA 8.9 cm  BPP is 8/8.

## 2024-11-21 NOTE — NST
Nonstress Test   Patient: Cindy Mcmillan    Gestation: 33w5d    NST:       Variability: Moderate           Accelerations: No (10x10 accelerations only)           Decelerations: Variable            Baseline: 145 BPM           Uterine Irritability: No           Contractions: Irregular                    Contraction Frequency: x4                   Acoustic Stimulator: No           Nonstress Test Interpretation: (!) Non-reactive           Nonstress Test Second Interpretation: (!) Non-reactive                     Additional Comments Comments: BPP 8/8  Physician Evaluation      NST Interpretation: Non-reactive    Disposition:   BPP    Comments: BPP with [facility] 8/8    Ekaterina Kim MD

## 2024-12-23 ENCOUNTER — HOSPITAL ENCOUNTER (INPATIENT)
Facility: HOSPITAL | Age: 39
LOS: 3 days | Discharge: HOME | End: 2024-12-26
Attending: OBSTETRICS & GYNECOLOGY | Admitting: OBSTETRICS & GYNECOLOGY
Payer: COMMERCIAL

## 2024-12-23 PROBLEM — Z34.90 PREGNANCY (HCC): Status: ACTIVE | Noted: 2024-12-23

## 2024-12-23 LAB
ANTIBODY SCREEN: NEGATIVE
BASOPHILS # BLD AUTO: 0.02 X10(3) UL (ref 0–0.2)
BASOPHILS NFR BLD AUTO: 0.3 %
EOSINOPHIL # BLD AUTO: 0.03 X10(3) UL (ref 0–0.7)
EOSINOPHIL NFR BLD AUTO: 0.4 %
ERYTHROCYTE [DISTWIDTH] IN BLOOD BY AUTOMATED COUNT: 14.6 %
HCT VFR BLD AUTO: 37.9 %
HGB BLD-MCNC: 12.9 G/DL
IMM GRANULOCYTES # BLD AUTO: 0.07 X10(3) UL (ref 0–1)
IMM GRANULOCYTES NFR BLD: 1 %
LYMPHOCYTES # BLD AUTO: 1.34 X10(3) UL (ref 1–4)
LYMPHOCYTES NFR BLD AUTO: 19.3 %
MCH RBC QN AUTO: 29.3 PG (ref 26–34)
MCHC RBC AUTO-ENTMCNC: 34 G/DL (ref 31–37)
MCV RBC AUTO: 85.9 FL
MONOCYTES # BLD AUTO: 0.81 X10(3) UL (ref 0.1–1)
MONOCYTES NFR BLD AUTO: 11.6 %
NEUTROPHILS # BLD AUTO: 4.69 X10 (3) UL (ref 1.5–7.7)
NEUTROPHILS # BLD AUTO: 4.69 X10(3) UL (ref 1.5–7.7)
NEUTROPHILS NFR BLD AUTO: 67.4 %
PLATELET # BLD AUTO: 226 10(3)UL (ref 150–450)
RBC # BLD AUTO: 4.41 X10(6)UL
RH BLOOD TYPE: POSITIVE
RH BLOOD TYPE: POSITIVE
T PALLIDUM AB SER QL IA: NONREACTIVE
WBC # BLD AUTO: 7 X10(3) UL (ref 4–11)

## 2024-12-23 PROCEDURE — 3E0P7VZ INTRODUCTION OF HORMONE INTO FEMALE REPRODUCTIVE, VIA NATURAL OR ARTIFICIAL OPENING: ICD-10-PCS | Performed by: STUDENT IN AN ORGANIZED HEALTH CARE EDUCATION/TRAINING PROGRAM

## 2024-12-23 RX ORDER — TERBUTALINE SULFATE 1 MG/ML
0.25 INJECTION, SOLUTION SUBCUTANEOUS AS NEEDED
Status: DISCONTINUED | OUTPATIENT
Start: 2024-12-23 | End: 2024-12-24

## 2024-12-23 RX ORDER — ONDANSETRON 2 MG/ML
4 INJECTION INTRAMUSCULAR; INTRAVENOUS EVERY 6 HOURS PRN
Status: DISCONTINUED | OUTPATIENT
Start: 2024-12-23 | End: 2024-12-24

## 2024-12-23 RX ORDER — SODIUM CHLORIDE, SODIUM LACTATE, POTASSIUM CHLORIDE, CALCIUM CHLORIDE 600; 310; 30; 20 MG/100ML; MG/100ML; MG/100ML; MG/100ML
INJECTION, SOLUTION INTRAVENOUS CONTINUOUS
Status: DISCONTINUED | OUTPATIENT
Start: 2024-12-23 | End: 2024-12-24

## 2024-12-23 RX ORDER — ACETAMINOPHEN 500 MG
1000 TABLET ORAL EVERY 6 HOURS PRN
Status: DISCONTINUED | OUTPATIENT
Start: 2024-12-23 | End: 2024-12-24

## 2024-12-23 RX ORDER — DEXTROSE, SODIUM CHLORIDE, SODIUM LACTATE, POTASSIUM CHLORIDE, AND CALCIUM CHLORIDE 5; .6; .31; .03; .02 G/100ML; G/100ML; G/100ML; G/100ML; G/100ML
INJECTION, SOLUTION INTRAVENOUS AS NEEDED
Status: DISCONTINUED | OUTPATIENT
Start: 2024-12-23 | End: 2024-12-24

## 2024-12-23 RX ORDER — ACETAMINOPHEN 500 MG
500 TABLET ORAL EVERY 6 HOURS PRN
Status: DISCONTINUED | OUTPATIENT
Start: 2024-12-23 | End: 2024-12-24

## 2024-12-23 RX ORDER — IBUPROFEN 600 MG/1
600 TABLET, FILM COATED ORAL ONCE AS NEEDED
Status: DISCONTINUED | OUTPATIENT
Start: 2024-12-23 | End: 2024-12-24

## 2024-12-23 RX ORDER — CITRIC ACID/SODIUM CITRATE 334-500MG
30 SOLUTION, ORAL ORAL AS NEEDED
Status: DISCONTINUED | OUTPATIENT
Start: 2024-12-23 | End: 2024-12-24

## 2024-12-23 NOTE — PROGRESS NOTES
Pt is a 39 year old female admitted to 106/-A.     Chief Complaint   Patient presents with    Scheduled Induction      Pt is  38w2d intra-uterine pregnancy.  History obtained, consents signed. Oriented to room, staff, and plan of care.
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
## 2024-12-24 ENCOUNTER — ANESTHESIA (OUTPATIENT)
Dept: OBGYN UNIT | Facility: HOSPITAL | Age: 39
End: 2024-12-24
Payer: COMMERCIAL

## 2024-12-24 ENCOUNTER — ANESTHESIA EVENT (OUTPATIENT)
Dept: OBGYN UNIT | Facility: HOSPITAL | Age: 39
End: 2024-12-24
Payer: COMMERCIAL

## 2024-12-24 PROCEDURE — 3E033VJ INTRODUCTION OF OTHER HORMONE INTO PERIPHERAL VEIN, PERCUTANEOUS APPROACH: ICD-10-PCS | Performed by: STUDENT IN AN ORGANIZED HEALTH CARE EDUCATION/TRAINING PROGRAM

## 2024-12-24 PROCEDURE — 59514 CESAREAN DELIVERY ONLY: CPT | Performed by: OBSTETRICS & GYNECOLOGY

## 2024-12-24 RX ORDER — NALBUPHINE HYDROCHLORIDE 10 MG/ML
2.5 INJECTION INTRAMUSCULAR; INTRAVENOUS; SUBCUTANEOUS
Status: DISCONTINUED | OUTPATIENT
Start: 2024-12-24 | End: 2024-12-24 | Stop reason: HOSPADM

## 2024-12-24 RX ORDER — DOCUSATE SODIUM 100 MG/1
100 CAPSULE, LIQUID FILLED ORAL
Status: DISCONTINUED | OUTPATIENT
Start: 2024-12-24 | End: 2024-12-26

## 2024-12-24 RX ORDER — LIDOCAINE HYDROCHLORIDE AND EPINEPHRINE 15; 5 MG/ML; UG/ML
INJECTION, SOLUTION EPIDURAL AS NEEDED
Status: DISCONTINUED | OUTPATIENT
Start: 2024-12-24 | End: 2024-12-24 | Stop reason: SURG

## 2024-12-24 RX ORDER — SIMETHICONE 80 MG
80 TABLET,CHEWABLE ORAL 3 TIMES DAILY PRN
Status: DISCONTINUED | OUTPATIENT
Start: 2024-12-24 | End: 2024-12-26

## 2024-12-24 RX ORDER — GABAPENTIN 300 MG/1
300 CAPSULE ORAL EVERY 8 HOURS SCHEDULED
Status: DISCONTINUED | OUTPATIENT
Start: 2024-12-24 | End: 2024-12-26

## 2024-12-24 RX ORDER — ONDANSETRON 2 MG/ML
4 INJECTION INTRAMUSCULAR; INTRAVENOUS EVERY 6 HOURS PRN
Status: DISCONTINUED | OUTPATIENT
Start: 2024-12-24 | End: 2024-12-26

## 2024-12-24 RX ORDER — OXYCODONE HYDROCHLORIDE 5 MG/1
5 TABLET ORAL EVERY 6 HOURS PRN
Status: DISCONTINUED | OUTPATIENT
Start: 2024-12-24 | End: 2024-12-26

## 2024-12-24 RX ORDER — DEXAMETHASONE SODIUM PHOSPHATE 4 MG/ML
VIAL (ML) INJECTION AS NEEDED
Status: DISCONTINUED | OUTPATIENT
Start: 2024-12-24 | End: 2024-12-24 | Stop reason: SURG

## 2024-12-24 RX ORDER — MORPHINE SULFATE 2 MG/ML
INJECTION, SOLUTION INTRAMUSCULAR; INTRAVENOUS AS NEEDED
Status: DISCONTINUED | OUTPATIENT
Start: 2024-12-24 | End: 2024-12-24 | Stop reason: SURG

## 2024-12-24 RX ORDER — BUPIVACAINE HCL/0.9 % NACL/PF 0.25 %
5 PLASTIC BAG, INJECTION (ML) EPIDURAL AS NEEDED
Status: DISCONTINUED | OUTPATIENT
Start: 2024-12-24 | End: 2024-12-24

## 2024-12-24 RX ORDER — NALOXONE HYDROCHLORIDE 0.4 MG/ML
0.08 INJECTION, SOLUTION INTRAMUSCULAR; INTRAVENOUS; SUBCUTANEOUS
Status: ACTIVE | OUTPATIENT
Start: 2024-12-24 | End: 2024-12-25

## 2024-12-24 RX ORDER — LIDOCAINE HCL/EPINEPHRINE/PF 2%-1:200K
VIAL (ML) INJECTION AS NEEDED
Status: DISCONTINUED | OUTPATIENT
Start: 2024-12-24 | End: 2024-12-24 | Stop reason: SURG

## 2024-12-24 RX ORDER — SODIUM CHLORIDE 9 MG/ML
INJECTION, SOLUTION INTRAMUSCULAR; INTRAVENOUS; SUBCUTANEOUS
Status: COMPLETED
Start: 2024-12-24 | End: 2024-12-24

## 2024-12-24 RX ORDER — AMMONIA INHALANTS 0.04 G/.3ML
0.3 INHALANT RESPIRATORY (INHALATION) AS NEEDED
Status: DISCONTINUED | OUTPATIENT
Start: 2024-12-24 | End: 2024-12-26

## 2024-12-24 RX ORDER — BUPIVACAINE HYDROCHLORIDE 2.5 MG/ML
INJECTION, SOLUTION EPIDURAL; INFILTRATION; INTRACAUDAL AS NEEDED
Status: DISCONTINUED | OUTPATIENT
Start: 2024-12-24 | End: 2024-12-24 | Stop reason: SURG

## 2024-12-24 RX ORDER — SODIUM CHLORIDE 9 MG/ML
10 INJECTION, SOLUTION INTRAMUSCULAR; INTRAVENOUS; SUBCUTANEOUS AS NEEDED
Status: DISCONTINUED | OUTPATIENT
Start: 2024-12-24 | End: 2024-12-24

## 2024-12-24 RX ORDER — CHOLECALCIFEROL (VITAMIN D3) 25 MCG
1 TABLET,CHEWABLE ORAL DAILY
Status: DISCONTINUED | OUTPATIENT
Start: 2024-12-24 | End: 2024-12-26

## 2024-12-24 RX ORDER — NALBUPHINE HYDROCHLORIDE 10 MG/ML
2.5 INJECTION INTRAMUSCULAR; INTRAVENOUS; SUBCUTANEOUS
Status: DISCONTINUED | OUTPATIENT
Start: 2024-12-24 | End: 2024-12-24

## 2024-12-24 RX ORDER — ACETAMINOPHEN 500 MG
1000 TABLET ORAL ONCE
Status: DISCONTINUED | OUTPATIENT
Start: 2024-12-24 | End: 2024-12-24

## 2024-12-24 RX ORDER — LIDOCAINE HYDROCHLORIDE 20 MG/ML
5 INJECTION, SOLUTION EPIDURAL; INFILTRATION; INTRACAUDAL; PERINEURAL AS NEEDED
Status: DISCONTINUED | OUTPATIENT
Start: 2024-12-24 | End: 2024-12-24

## 2024-12-24 RX ORDER — BUPIVACAINE HYDROCHLORIDE 2.5 MG/ML
30 INJECTION, SOLUTION EPIDURAL; INFILTRATION; INTRACAUDAL AS NEEDED
Status: DISCONTINUED | OUTPATIENT
Start: 2024-12-24 | End: 2024-12-24

## 2024-12-24 RX ORDER — SODIUM CHLORIDE, SODIUM LACTATE, POTASSIUM CHLORIDE, CALCIUM CHLORIDE 600; 310; 30; 20 MG/100ML; MG/100ML; MG/100ML; MG/100ML
INJECTION, SOLUTION INTRAVENOUS CONTINUOUS PRN
Status: DISCONTINUED | OUTPATIENT
Start: 2024-12-24 | End: 2024-12-24 | Stop reason: SURG

## 2024-12-24 RX ORDER — CITRIC ACID/SODIUM CITRATE 334-500MG
SOLUTION, ORAL ORAL
Status: DISPENSED
Start: 2024-12-24 | End: 2024-12-24

## 2024-12-24 RX ORDER — ACETAMINOPHEN 500 MG
1000 TABLET ORAL EVERY 6 HOURS
Status: DISCONTINUED | OUTPATIENT
Start: 2024-12-24 | End: 2024-12-26

## 2024-12-24 RX ORDER — KETOROLAC TROMETHAMINE 30 MG/ML
30 INJECTION, SOLUTION INTRAMUSCULAR; INTRAVENOUS EVERY 6 HOURS
Status: COMPLETED | OUTPATIENT
Start: 2024-12-24 | End: 2024-12-25

## 2024-12-24 RX ORDER — ENOXAPARIN SODIUM 100 MG/ML
40 INJECTION SUBCUTANEOUS DAILY
Status: DISCONTINUED | OUTPATIENT
Start: 2024-12-24 | End: 2024-12-24

## 2024-12-24 RX ORDER — SODIUM CHLORIDE, SODIUM LACTATE, POTASSIUM CHLORIDE, CALCIUM CHLORIDE 600; 310; 30; 20 MG/100ML; MG/100ML; MG/100ML; MG/100ML
INJECTION, SOLUTION INTRAVENOUS CONTINUOUS
Status: DISCONTINUED | OUTPATIENT
Start: 2024-12-24 | End: 2024-12-26

## 2024-12-24 RX ORDER — KETOROLAC TROMETHAMINE 30 MG/ML
INJECTION, SOLUTION INTRAMUSCULAR; INTRAVENOUS
Status: COMPLETED
Start: 2024-12-24 | End: 2024-12-24

## 2024-12-24 RX ORDER — DEXTROSE, SODIUM CHLORIDE, SODIUM LACTATE, POTASSIUM CHLORIDE, AND CALCIUM CHLORIDE 5; .6; .31; .03; .02 G/100ML; G/100ML; G/100ML; G/100ML; G/100ML
INJECTION, SOLUTION INTRAVENOUS CONTINUOUS PRN
Status: DISCONTINUED | OUTPATIENT
Start: 2024-12-24 | End: 2024-12-26

## 2024-12-24 RX ORDER — DIPHENHYDRAMINE HYDROCHLORIDE 50 MG/ML
12.5 INJECTION INTRAMUSCULAR; INTRAVENOUS EVERY 4 HOURS PRN
Status: DISCONTINUED | OUTPATIENT
Start: 2024-12-24 | End: 2024-12-26

## 2024-12-24 RX ORDER — DIPHENHYDRAMINE HCL 25 MG
25 CAPSULE ORAL EVERY 4 HOURS PRN
Status: DISCONTINUED | OUTPATIENT
Start: 2024-12-24 | End: 2024-12-26

## 2024-12-24 RX ORDER — NALBUPHINE HYDROCHLORIDE 10 MG/ML
2.5 INJECTION INTRAMUSCULAR; INTRAVENOUS; SUBCUTANEOUS EVERY 4 HOURS PRN
Status: DISCONTINUED | OUTPATIENT
Start: 2024-12-24 | End: 2024-12-26

## 2024-12-24 RX ORDER — HYDROMORPHONE HYDROCHLORIDE 1 MG/ML
0.4 INJECTION, SOLUTION INTRAMUSCULAR; INTRAVENOUS; SUBCUTANEOUS EVERY 2 HOUR PRN
Status: DISCONTINUED | OUTPATIENT
Start: 2024-12-24 | End: 2024-12-24

## 2024-12-24 RX ORDER — HYDROMORPHONE HYDROCHLORIDE 1 MG/ML
0.4 INJECTION, SOLUTION INTRAMUSCULAR; INTRAVENOUS; SUBCUTANEOUS EVERY 5 MIN PRN
Status: DISCONTINUED | OUTPATIENT
Start: 2024-12-24 | End: 2024-12-24 | Stop reason: HOSPADM

## 2024-12-24 RX ORDER — ONDANSETRON 2 MG/ML
4 INJECTION INTRAMUSCULAR; INTRAVENOUS ONCE AS NEEDED
Status: DISCONTINUED | OUTPATIENT
Start: 2024-12-24 | End: 2024-12-24 | Stop reason: HOSPADM

## 2024-12-24 RX ORDER — HYDROMORPHONE HYDROCHLORIDE 1 MG/ML
0.2 INJECTION, SOLUTION INTRAMUSCULAR; INTRAVENOUS; SUBCUTANEOUS EVERY 5 MIN PRN
Status: DISCONTINUED | OUTPATIENT
Start: 2024-12-24 | End: 2024-12-24 | Stop reason: HOSPADM

## 2024-12-24 RX ORDER — IBUPROFEN 600 MG/1
600 TABLET, FILM COATED ORAL EVERY 6 HOURS
Status: DISCONTINUED | OUTPATIENT
Start: 2024-12-25 | End: 2024-12-26

## 2024-12-24 RX ORDER — ONDANSETRON 2 MG/ML
INJECTION INTRAMUSCULAR; INTRAVENOUS AS NEEDED
Status: DISCONTINUED | OUTPATIENT
Start: 2024-12-24 | End: 2024-12-24 | Stop reason: SURG

## 2024-12-24 RX ORDER — BISACODYL 10 MG
10 SUPPOSITORY, RECTAL RECTAL ONCE AS NEEDED
Status: DISCONTINUED | OUTPATIENT
Start: 2024-12-24 | End: 2024-12-26

## 2024-12-24 RX ORDER — LIDOCAINE HYDROCHLORIDE AND EPINEPHRINE 15; 5 MG/ML; UG/ML
5 INJECTION, SOLUTION EPIDURAL AS NEEDED
Status: DISCONTINUED | OUTPATIENT
Start: 2024-12-24 | End: 2024-12-24

## 2024-12-24 RX ADMIN — DEXAMETHASONE SODIUM PHOSPHATE 4 MG: 4 MG/ML VIAL (ML) INJECTION at 08:18:00

## 2024-12-24 RX ADMIN — ONDANSETRON 4 MG: 2 INJECTION INTRAMUSCULAR; INTRAVENOUS at 08:18:00

## 2024-12-24 RX ADMIN — SODIUM CHLORIDE, SODIUM LACTATE, POTASSIUM CHLORIDE, CALCIUM CHLORIDE: 600; 310; 30; 20 INJECTION, SOLUTION INTRAVENOUS at 08:12:00

## 2024-12-24 RX ADMIN — LIDOCAINE HCL/EPINEPHRINE/PF 5 ML: 2%-1:200K VIAL (ML) INJECTION at 08:12:00

## 2024-12-24 RX ADMIN — LIDOCAINE HCL/EPINEPHRINE/PF 5 ML: 2%-1:200K VIAL (ML) INJECTION at 08:08:00

## 2024-12-24 RX ADMIN — LIDOCAINE HCL/EPINEPHRINE/PF 5 ML: 2%-1:200K VIAL (ML) INJECTION at 08:17:00

## 2024-12-24 RX ADMIN — LIDOCAINE HYDROCHLORIDE AND EPINEPHRINE 3 ML: 15; 5 INJECTION, SOLUTION EPIDURAL at 01:21:00

## 2024-12-24 RX ADMIN — MORPHINE SULFATE 2 MG: 2 INJECTION, SOLUTION INTRAMUSCULAR; INTRAVENOUS at 08:36:00

## 2024-12-24 RX ADMIN — BUPIVACAINE HYDROCHLORIDE 10 ML: 2.5 INJECTION, SOLUTION EPIDURAL; INFILTRATION; INTRACAUDAL at 01:25:00

## 2024-12-24 NOTE — ANESTHESIA PROCEDURE NOTES
Labor Analgesia    Date/Time: 12/24/2024 1:03 AM    Performed by: Kurt Orr MD  Authorized by: Kurt Orr MD      General Information and Staff    Start Time:  12/24/2024 1:03 AM  End Time:  12/24/2024 1:20 AM  Anesthesiologist:  Kurt Orr MD  Performed by:  Anesthesiologist  Patient Location:  OB  Site Identification: surface landmarks    Reason for Block: labor epidural    Preanesthetic Checklist: patient identified, IV checked, risks and benefits discussed, monitors and equipment checked, pre-op evaluation, timeout performed, IV bolus, anesthesia consent and sterile technique used      Procedure Details    Patient Position:  Sitting  Prep: ChloraPrep    Monitoring:  Heart rate and continuous pulse ox  Approach:  Midline    Epidural Needle    Injection Technique:  RAUL saline  Needle Type:  Tuohy  Needle Gauge:  17 G  Needle Length:  3.375 in  Needle Insertion Depth:  6  Location:  L3-4    Spinal Needle      Catheter    Catheter Type:  End hole  Catheter Size:  19 G  Catheter at Skin Depth:  12  Test Dose:  Negative    Assessment      Additional Comments     No heme or paresthesias noted throughout.  Patient tolerated procedure well without any apparent anesthetic complications.

## 2024-12-24 NOTE — ANESTHESIA POSTPROCEDURE EVALUATION
[facility]    Cindy Mcmillan Patient Status:  Inpatient   Age/Gender 39 year old female MRN [id number]   Location [facility] LABOR & DELIVERY Attending Sherry Fernandes MD   Hosp Day # 1 PCP No primary care provider on file.       Anesthesia Post-op Note     SECTION    Procedure Summary       Date: 24 Room / Location:  L+D OR   L+D OR    Anesthesia Start: 103 Anesthesia Stop: 921    Procedure:  SECTION Diagnosis:     Surgeons: Ekaterina Kim MD Anesthesiologist: Rossy Castillo DO    Anesthesia Type: epidural ASA Status: 2            Anesthesia Type: epidural    Vitals Value Taken Time   /65 24 0921   Temp 97.9 24 0921   Pulse 107 24 0921   Resp 18 24 0921   SpO2 100 24 0921       Patient Location: Labor and Delivery    Anesthesia Type: epidural    Airway Patency: patent    Postop Pain Control: adequate    Mental Status: preanesthetic baseline    Nausea/Vomiting: none    Cardiopulmonary/Hydration status: stable euvolemic    Complications: no apparent anesthesia related complications    Postop vital signs: stable    Dental Exam: Unchanged from Preop    Patient to be discharged from PACU when criteria met.           Ketoconazole Pregnancy And Lactation Text: This medication is Pregnancy Category C and it isn't know if it is safe during pregnancy. It is also excreted in breast milk and breast feeding isn't recommended.

## 2024-12-24 NOTE — PLAN OF CARE
Problem: BIRTH - VAGINAL/ SECTION  Goal: Fetal and maternal status remain reassuring during the birth process  Description: INTERVENTIONS:  - Monitor vital signs  - Monitor fetal heart rate  - Monitor uterine activity  - Monitor labor progression (vaginal delivery)  - DVT prophylaxis (C/S delivery)  - Surgical antibiotic prophylaxis (C/S delivery)  Outcome: Progressing     Problem: PAIN - ADULT  Goal: Verbalizes/displays adequate comfort level or patient's stated pain goal  Description: INTERVENTIONS:  - Encourage pt to monitor pain and request assistance  - Assess pain using appropriate pain scale  - Administer analgesics based on type and severity of pain and evaluate response  - Implement non-pharmacological measures as appropriate and evaluate response  - Consider cultural and social influences on pain and pain management  - Manage/alleviate anxiety  - Utilize distraction and/or relaxation techniques  - Monitor for opioid side effects  - Notify MD/LIP if interventions unsuccessful or patient reports new pain  - Anticipate increased pain with activity and pre-medicate as appropriate  Outcome: Progressing     Problem: ANXIETY  Goal: Will report anxiety at manageable levels  Description: INTERVENTIONS:  - Administer medication as ordered  - Teach and rehearse alternative coping skills  - Provide emotional support with 1:1 interaction with staff  Outcome: Progressing     Problem: Patient/Family Goals  Goal: Patient/Family Long Term Goal  Description: Patient's Long Term Goal: Uncomplicated vaginal delivery    Interventions:  VS per protocol  I&O  Ice chips and sips as tolerated  EFM per protocol  Maintain IV as ordered  Antibiotics as needed per protocol  Informed consent       - See additional Care Plan goals for specific interventions  Outcome: Progressing  Goal: Patient/Family Short Term Goal  Description: Patient's Short Term Goal: Adequate pain control with delivery of infant    Interventions:  Pain  assessment scores as ordered  Patient scores pain a \"3\" or less  Multidisciplinary care   Nonpharmacologic comfort measures       - See additional Care Plan goals for specific interventions  Outcome: Progressing

## 2024-12-24 NOTE — OPERATIVE REPORT
[facility]  Operative Report:  Section    Cindy Mcmillan Patient Status:  Inpatient    3/1/1985 MRN [id number]   Location [facility] LABOR & DELIVERY Attending Sherry Fernandes MD   Hosp Day # 1 PCP No primary care provider on file.     Date of procedure: 2024    Pre-operative Diagnosis:   1. IUP at 38w3d   2. Category 2 tracing on office NST with FRANCIA 5.5cm sent for induction of labor  3. Arrest of descent  4. Hx of incompetent cervix s/p cerclage removal at 36 weeks  5. AMA  6. Increased BMI  7. GBS positive    Post-operative Diagnosis: same, delivered    Procedure: primary low transverse  section with Pfannenstiel skin incision    Surgeon: Ekaterina Kim MD    Assistants: Ravinder Martin MD    Anesthesia: epidural anesthesia     Complications: None    Quantitative Blood Loss:  595 mL           Drains: 225 mL blood tinged urine at the end of procedure           Indications: Pt came for induction of labor secondary to declerations on her office NST with FRANCIA 5.5cm at 38w2d. She received misoprostol then progressed to pitocin per protocol. Fetal tracing was noted to have decelerations throughout labor but had good variability overall. SROM occurred, and the patient progressed to complete and began pushing at 0418. After pushing approximately 3.5 hours with no further progress with pushes the patient was prepped for  section.     Findings:   1) Viable female infant in vertex OP presentation position. APGARS 9 & 9.  wt - 7 lbs 1.9 oz   2) 3VC, clear fluid, placenta intact  3) Normal appearing uterus, b/l tubes and b/l ovaries.  4) good hemostasis at the end of procedure    Condition: stable    Disposition: to RR    Procedure Details:  The patient was taken to operating room, identified as Cindy Mcmillan and the procedure verified as  Delivery. A Time Out was held and the above information confirmed.    After induction of anesthesia, the patient was prepped and  draped in the normal sterile fashion in the dorsal supine position with a leftward tilt.  A Pfannenstiel skin incision was then made with the scalpel and carried through to the underlying layer of fascia.  The fascia was incised in the midline and the incision extended laterally with the Carrington scissors.  The inferior aspect of the fascial incision was then grasped with the Kocher clamps, elevated, and the underlying rectus muscles dissected off bluntly and with the Carrington scissors.  Attention was then turned to the superior aspect of the incision which, in a similar fashion, was grasped, tented up with the Kocher clamps, and the rectus muscle dissected off bluntly and with the electrocautery pencil.  The rectus muscles were then  in the midline, the peritoneum identified, tented up, and entered bluntly. The peritoneal incision was then extended with good visualization of the bladder.  The bladder blade was then inserted and the vesicouterine peritoneum identified, grasped with pick-ups, and entered sharply with the Metzenbaum scissors.  The incision was extended laterally and the bladder flap created digitally.      The bladder blade was then reinserted and the lower uterine segment incised in the midline in a transverse fashion with the scalpel. The incision was then extended laterally using blunt dissection. The bladder blade was removed and the head was delivered atraumatically. The nose and mouth were suctioned with the Bulb suction, and the cord clamped and cut after a 30 second delay.  The infant was handed off to the waiting neonatologist.     The placenta was then removed manually.  The uterus was exteriorized and cleared of all clots and debris. The uterine incision was repaired with 0-vicryl in a running-locked fashion in two layers. The uterus was returned to the abdomen.  The right and left gutters were then cleared of all clots.  The peritoneum was approximated with running 2-0 vicryl. The fascia  was then reapproximated with 0-Vicryl in a running fashion.  The subcutaneous tissue was approximated with running 2-0 vicryl. The skin was closed with 3-0 monocryl.  The uterus was expressed for clots at then end of the procedure.    The patient tolerated the procedure well.  Sponge, lap, and needle counts were correct times two. The patient was taken to the recovery room in stable condition.      Ekaterina Kim MD  12/24/2024

## 2024-12-24 NOTE — BRIEF OP NOTE
Pre-Operative Diagnosis: arrest of descent     Post-Operative Diagnosis: arrest of descent     Procedure Performed:    SECTION    Surgeons and Role:     * Ekaterina Kim MD - Primary     * Ravinder Martin MD - Assisting Surgeon    Assistant(s):        Surgical Findings: OP presentation     Specimen: placenta     Blood Loss: No data recorded      Ekaterina Kim MD  2024  9:31 AM

## 2024-12-24 NOTE — PLAN OF CARE
Problem: GENITOURINARY - ADULT  Goal: Absence of urinary retention  Description: INTERVENTIONS:  - Assess patient’s ability to void and empty bladder  - Monitor intake/output and perform bladder scan as needed  - Follow urinary retention protocol/standard of care  - Consider collaborating with pharmacy to review patient's medication profile  - Implement strategies to promote bladder emptying  Outcome: Progressing     Problem: SAFETY ADULT - FALL  Goal: Free from fall injury  Description: INTERVENTIONS:  - Assess pt frequently for physical needs  - Identify cognitive and physical deficits and behaviors that affect risk of falls.  - [place] fall precautions as indicated by assessment.  - Educate pt/family on patient safety including physical limitations  - Instruct pt to call for assistance with activity based on assessment  - Modify environment to reduce risk of injury  - Provide assistive devices as appropriate  - Consider OT/PT consult to assist with strengthening/mobility  - Encourage toileting schedule  Outcome: Progressing     Problem: POSTPARTUM  Goal: Long Term Goal:Experiences normal postpartum course  Description: INTERVENTIONS:  - Assess and monitor vital signs and lab values.  - Assess fundus and lochia.  - Provide ice/sitz baths for perineum discomfort.  - Monitor healing of incision/episiotomy/laceration, and assess for signs and symptoms of infection and hematoma.  - Assess bladder function and monitor for bladder distention.  - Provide/instruct/assist with pericare as needed.  - Provide VTE prophylaxis as needed.  - Monitor bowel function.  - Encourage ambulation and provide assistance as needed.  - Assess and monitor emotional status and provide social service/psych resources as needed.  - Utilize standard precautions and use personal protective equipment as indicated. Ensure aseptic care of all intravenous lines and invasive tubes/drains.  - Obtain immunization and exposure to communicable  diseases history.  Outcome: Progressing  Goal: Optimize infant feeding at the breast  Description: INTERVENTIONS:  - Initiate breast feeding within first hour after birth.   - Monitor effectiveness of current breast feeding efforts.  - Assess support systems available to mother/family.  - Identify cultural beliefs/practices regarding lactation, letdown techniques, maternal food preferences.  - Assess mother's knowledge and previous experience with breast feeding.  - Provide information as needed about early infant feeding cues (e.g., rooting, lip smacking, sucking fingers/hand) versus late cue of crying.  - Discuss/demonstrate breast feeding aids (e.g., infant sling, nursing footstool/pillows, and breast pumps).  - Encourage mother/other family members to express feelings/concerns, and actively listen.  - Educate father/SO about benefits of breast feeding and how to manage common lactation challenges.  - Recommend avoidance of specific medications or substances incompatible with breast feeding.  - Assess and monitor for signs of nipple pain/trauma.  - Instruct and provide assistance with proper latch.  - Review techniques for milk expression (breast pumping) and storage of breast milk. Provide pumping equipment/supplies, instructions and assistance, as needed.  - Encourage rooming-in and breast feeding on demand.  - Encourage skin-to-skin contact.  - Provide LC support as needed.  - Assess for and manage engorgement.  - Provide breast feeding education handouts and information on community breast feeding support.   Outcome: Progressing  Goal: Establishment of adequate milk supply with medication/procedure interruptions  Description: INTERVENTIONS:  - Review techniques for milk expression (breast pumping).   - Provide pumping equipment/supplies, instructions, and assistance until it is safe to breastfeed infant.  Outcome: Progressing  Goal: Appropriate maternal -  bonding  Description: INTERVENTIONS:  - Assess  caregiver- interactions.  - Assess caregiver's emotional status and coping mechanisms.  - Encourage caregiver to participate in  daily care.  - Assess support systems available to mother/family.  - Provide /case management support as needed.  Outcome: Progressing

## 2024-12-24 NOTE — PROGRESS NOTES
Patient transferred to mother/baby room 2218 per cart in stable condition with baby and personal belongings.  Accompanied by  and staff.  Report given to mother/baby NEW Bravo.

## 2024-12-24 NOTE — PROGRESS NOTES
Patient has been pushing for approximately 3.5 hours. Fetal station at +1 station with significant vulvar and vaginal edema and no progress with pushing currently. Arrest of descent discussed. Patient agreeable to proceed with  section.     Ekaterina Kim MD     counseling the patient about disease process, benefits of medications, test results, prognosis, importance of compliance, instructions for management of acute symptoms, and follow up plans.     [facility], DO

## 2024-12-24 NOTE — PLAN OF CARE
Problem: BIRTH - VAGINAL/ SECTION  Goal: Fetal and maternal status remain reassuring during the birth process  Description: INTERVENTIONS:  - Monitor vital signs  - Monitor fetal heart rate  - Monitor uterine activity  - Monitor labor progression (vaginal delivery)  - DVT prophylaxis (C/S delivery)  - Surgical antibiotic prophylaxis (C/S delivery)  2024 1010 by Carina Mccall RN  Outcome: Completed  2024 07 by Carina Mccall RN  Outcome: Progressing     Problem: PAIN - ADULT  Goal: Verbalizes/displays adequate comfort level or patient's stated pain goal  Description: INTERVENTIONS:  - Encourage pt to monitor pain and request assistance  - Assess pain using appropriate pain scale  - Administer analgesics based on type and severity of pain and evaluate response  - Implement non-pharmacological measures as appropriate and evaluate response  - Consider cultural and social influences on pain and pain management  - Manage/alleviate anxiety  - Utilize distraction and/or relaxation techniques  - Monitor for opioid side effects  - Notify MD/LIP if interventions unsuccessful or patient reports new pain  - Anticipate increased pain with activity and pre-medicate as appropriate  2024 1010 by Carina Mccall RN  Outcome: Completed  2024 07 by Carina Mccall RN  Outcome: Progressing     Problem: ANXIETY  Goal: Will report anxiety at manageable levels  Description: INTERVENTIONS:  - Administer medication as ordered  - Teach and rehearse alternative coping skills  - Provide emotional support with 1:1 interaction with staff  2024 1010 by Carina Mccall RN  Outcome: Completed  2024 07 by Carina Mccall RN  Outcome: Progressing     Problem: Patient/Family Goals  Goal: Patient/Family Long Term Goal  Description: Patient's Long Term Goal: Uncomplicated vaginal delivery    Interventions:  VS per protocol  I&O  Ice chips and sips as tolerated  EFM per protocol  Maintain  IV as ordered  Antibiotics as needed per protocol  Informed consent       - See additional Care Plan goals for specific interventions  12/24/2024 1010 by Carina Mcacll RN  Outcome: Completed  12/24/2024 0742 by Carina Mccall, RN  Outcome: Progressing  Goal: Patient/Family Short Term Goal  Description: Patient's Short Term Goal: Adequate pain control with delivery of infant    Interventions:  Pain assessment scores as ordered  Patient scores pain a \"3\" or less  Multidisciplinary care   Nonpharmacologic comfort measures       - See additional Care Plan goals for specific interventions  12/24/2024 1010 by Carina Mccall, RN  Outcome: Completed  12/24/2024 0742 by Carina Mccall, RN  Outcome: Progressing

## 2024-12-24 NOTE — L&D DELIVERY NOTE
Deyanira, Girl [[id number]]      Labor Events     labor?: No   steroids?: None  Antibiotics received during labor?: Yes  Antibiotics (enter # doses in comment): ampicillin  Rupture date/time: 2024 0200     Rupture type: SROM  Fluid color: Clear  Labor type: Induced Onset of Labor  Induction: Misoprostol, Oxytocin  Cervical ripening date/time: 2024  Indications for induction: Oligohydramnios  Induction comment: Hx incompetent cervix with a cercalge  Intrapartum & labor complications: Group B beta strep +, Oligohydramnios       Labor Event Times    Dilation complete date/time: 2024 0402  Start pushing date/time: 2024 0418  Decision date/time (emergent ): 2024 0754       [place] Presentation    Presentation: Vertex  Position: Occiput Anterior       Operative Delivery    Operative Vaginal Delivery: N/A                Shoulder Dystocia    Shoulder Dystocia: N/A       Anesthesia    Method: Epidural              [place] Delivery      Delivery date/time:  24 08:33:08   Delivery type: Caesarean Section    Details:   categorization: Primary    priority: unscheduled   Indications for : Arrest of Descent   Skin incision type: 1 Pfannenstiel   Uterine Incision type: Low Transverse      Delivery location: OR       Delivery Providers    Delivering Clinician: Sherry Fernandes MD   Delivery personnel:  Provider Role   Stacia Tilley, NEW Baby Nurse   Carina Mccall RN Delivery Nurse   Jaya John MD Neonatologist             Cord    Vessels: 3 Vessels  Complications: None  Timed cord clamping: Yes  Time in sec: 30  Cord blood disposition: to lab  Gases sent?: No       Resuscitation    Method: None       [place] Measurements      Weight: 3230 g 7 lb 1.9 oz Length: 47 cm     Head circum.: 36 cm Chest circum.: 32.5 cm      Abdominal circum.: 30 cm    Observed anomalies, comments: American spots on buttocks and lower back       Placenta     Date/time: 2024 0833  Removal: Manual Removal  Appearance: Intact  Disposition: held for future pathology       Apgars    Living status: Living   Apgar Scoring Key:    0 1 2    Skin color Blue or pale Acrocyanotic Completely pink    Heart rate Absent <100 bpm >100 bpm    Reflex irritability No response Grimace Cry or active withdrawal    Muscle tone Limp Some flexion Active motion    Respiratory effort Absent Weak cry; hypoventilation Good, crying              1 Minute:  5 Minute:  10 Minute:  15 Minute:  20 Minute:      Skin color: 1  1       Heart rate: 2  2       Reflex irritablity: 2  2       Muscle tone: 2  2       Respiratory effort: 2  2       Total: 9  9          Apgars assigned by: DR ZAMAN   disposition: with mother       Skin to Skin    Skin to skin initiated date/time: 2024 0922       Vaginal Count    No data filed       Lacerations    Episiotomy: None  Perineal lacerations: None      Vaginal laceration?: No      Cervical laceration?: No    Clitoral laceration?: No

## 2024-12-24 NOTE — ANESTHESIA PREPROCEDURE EVALUATION
PRE-OP EVALUATION    Patient Name: Cindy Mcmillan    Admit Diagnosis: pregnancy  Pregnancy (HCC)    Pre-op Diagnosis: * No surgery found *        Anesthesia Procedure: LABOR ANALGESIA    * Surgery not found *    Pre-op vitals reviewed.  Temp: 97.9 °F (36.6 °C)  Pulse: 84  Resp: 18  BP: 117/58     Body mass index is 33.45 kg/m².    Current medications reviewed.  Hospital Medications:   [COMPLETED] lactated ringers IV bolus 1,000 mL  1,000 mL Intravenous Once    fentaNYL-bupivacaine 2 mcg/mL-0.125% in sodium chloride 0.9% 100 mL EPIDURAL infusion premix  12 mL/hr Epidural Continuous    fentaNYL (Sublimaze) 50 mcg/mL injection 100 mcg  100 mcg Epidural Once    lidocaine 1.5%-EPINEPHrine 1:200,000 (Xylocaine-Epinephrine) injection  5 mL Injection PRN    bupivacaine PF (Marcaine) 0.25% injection  30 mL Injection PRN    lidocaine PF (Xylocaine-MPF) 2% injection  5 mL Injection PRN    sodium chloride 0.9% PF injection 10 mL  10 mL Injection PRN    ePHEDrine (PF) 25 MG/5 ML injection 5 mg  5 mg Intravenous PRN    nalbuphine (Nubain) 10 mg/mL injection 2.5 mg  2.5 mg Intravenous Q15 Min PRN    sodium chloride 0.9% PF 0.9% injection        lactated ringers infusion   Intravenous Continuous    dextrose in lactated ringers 5% infusion   Intravenous PRN    lactated ringers IV bolus 500 mL  500 mL Intravenous PRN    acetaminophen (Tylenol Extra Strength) tab 500 mg  500 mg Oral Q6H PRN    acetaminophen (Tylenol Extra Strength) tab 1,000 mg  1,000 mg Oral Q6H PRN    ibuprofen (Motrin) tab 600 mg  600 mg Oral Once PRN    ondansetron (Zofran) 4 MG/2ML injection 4 mg  4 mg Intravenous Q6H PRN    oxyTOCIN in sodium chloride 0.9% (Pitocin) 30 Units/500mL infusion premix  62.5-900 simin-units/min Intravenous Continuous    terbutaline (Brethine) 1 MG/ML injection 0.25 mg  0.25 mg Subcutaneous PRN    sodium citrate-citric acid (Bicitra) 500-334 MG/5ML oral solution 30 mL  30 mL Oral PRN    oxyTOCIN in sodium chloride 0.9% (Pitocin) 30  Pt received tylenol, benadryl, and solu-cortef as ordered for premeds. 30 g gamunex C given as ordered. Pt tolerated well. Units/500mL infusion premix  0.5-20 simin-units/min Intravenous Continuous    [] misoprostol (CYTOTEC) partial tablets 25 mcg  25 mcg Oral Q2H    [COMPLETED] ampicillin (Omnipen) 2 g in sodium chloride 0.9% 100 mL IVPB-MBP  2 g Intravenous Once    Followed by    ampicillin (Omnipen) 1 g in sodium chloride 0.9% 100 mL IVPB-MBP  1 g Intravenous Q4H       Outpatient Medications:   Prescriptions Prior to Admission[1]    Allergies: Patient has no known allergies.      Anesthesia Evaluation        Anesthetic Complications           GI/Hepatic/Renal    Negative GI/hepatic/renal ROS.                             Cardiovascular    Negative cardiovascular ROS.    Exercise tolerance: good     MET: >4                                           Endo/Other    Negative endo/other ROS.                              Pulmonary    Negative pulmonary ROS.                       Neuro/Psych    Negative neuro/psych ROS.                                  Past Surgical History:   Procedure Laterality Date    D&c after delivery  2023    retained placenta    Hc cervical cerclage N/A     Tonsillectomy  2019     Social History     Socioeconomic History    Marital status:    Tobacco Use    Smoking status: Never    Smokeless tobacco: Never   Vaping Use    Vaping status: Never Used   Substance and Sexual Activity    Alcohol use: Not Currently    Drug use: Never     History   Drug Use Unknown     Available pre-op labs reviewed.  Lab Results   Component Value Date    WBC 7.0 2024    RBC 4.41 2024    HGB 12.9 2024    HCT 37.9 2024    MCV 85.9 2024    MCH 29.3 2024    MCHC 34.0 2024    RDW 14.6 2024    .0 2024               Airway      Mallampati: II  Mouth opening: >3 FB  TM distance: > 6 cm  Neck ROM: full Cardiovascular    Cardiovascular exam normal.         Dental    Dentition appears grossly intact         Pulmonary    Pulmonary exam normal.                 Other findings               ASA: 2   Plan: epidural  NPO status verified and patient meets guidelines.          Plan/risks discussed with: patient                Present on Admission:  **None**             [1]   Medications Prior to Admission   Medication Sig Dispense Refill Last Dose/Taking    Ferrous Sulfate (IRON) 325 (65 Fe) MG Oral Tab Take 325 mg by mouth daily.   12/23/2024    prenatal vitamin with DHA 27-0.8-228 MG Oral Cap Take 1 capsule by mouth daily.   12/23/2024    Cholecalciferol 125 MCG (5000 UT) Oral Tab Take 1 tablet (5,000 Units total) by mouth daily.   12/23/2024    PROGESTERONE 200 MG Oral Cap PLACE 1 CAPSULE VAGINALLY NIGHTLY 90 capsule 0     progesterone 100 MG Oral Cap Take 1 capsule (100 mg total) by mouth nightly. Pt taking 3 tabs at bedtime       ondansetron 4 MG Oral Tablet Dispersible Take 1 tablet (4 mg total) by mouth every 8 (eight) hours as needed for Nausea.       Calcium 250 MG Oral Cap Take by mouth.

## 2024-12-25 LAB
BASOPHILS # BLD AUTO: 0.02 X10(3) UL (ref 0–0.2)
BASOPHILS NFR BLD AUTO: 0.1 %
EOSINOPHIL # BLD AUTO: 0.07 X10(3) UL (ref 0–0.7)
EOSINOPHIL NFR BLD AUTO: 0.4 %
ERYTHROCYTE [DISTWIDTH] IN BLOOD BY AUTOMATED COUNT: 14.8 %
HCT VFR BLD AUTO: 35.1 %
HGB BLD-MCNC: 11.7 G/DL
IMM GRANULOCYTES # BLD AUTO: 0.17 X10(3) UL (ref 0–1)
IMM GRANULOCYTES NFR BLD: 1.1 %
LYMPHOCYTES # BLD AUTO: 1.97 X10(3) UL (ref 1–4)
LYMPHOCYTES NFR BLD AUTO: 12.2 %
MCH RBC QN AUTO: 28.7 PG (ref 26–34)
MCHC RBC AUTO-ENTMCNC: 33.3 G/DL (ref 31–37)
MCV RBC AUTO: 86.2 FL
MONOCYTES # BLD AUTO: 1.6 X10(3) UL (ref 0.1–1)
MONOCYTES NFR BLD AUTO: 9.9 %
NEUTROPHILS # BLD AUTO: 12.32 X10 (3) UL (ref 1.5–7.7)
NEUTROPHILS # BLD AUTO: 12.32 X10(3) UL (ref 1.5–7.7)
NEUTROPHILS NFR BLD AUTO: 76.3 %
PLATELET # BLD AUTO: 185 10(3)UL (ref 150–450)
RBC # BLD AUTO: 4.07 X10(6)UL
WBC # BLD AUTO: 16.2 X10(3) UL (ref 4–11)

## 2024-12-25 NOTE — PROGRESS NOTES
Labor Analgesia Follow Up Note    Patient underwent epidural anesthesia for labor analgesia,    Placenta Date/Time: 12/24/2024  8:33 AM    Delivery Date/Time:: 12/24/2024  8:33 AM    /61 (BP Location: Left arm)   Pulse 98   Temp 97.8 °F (36.6 °C) (Oral)   Resp 16   Wt 91.2 kg (201 lb)   SpO2 97%   Breastfeeding Yes   BMI 33.45 kg/m²     Assessment:  Patient seen and no apparent anesthesia related complications.    Thank you for asking us to participate in the care of your patient.

## 2024-12-25 NOTE — PLAN OF CARE
Problem: GENITOURINARY - ADULT  Goal: Absence of urinary retention  Description: INTERVENTIONS:  - Assess patient’s ability to void and empty bladder  - Monitor intake/output and perform bladder scan as needed  - Follow urinary retention protocol/standard of care  - Consider collaborating with pharmacy to review patient's medication profile  - Implement strategies to promote bladder emptying  Outcome: Progressing     Problem: POSTPARTUM  Goal: Long Term Goal:Experiences normal postpartum course  Description: INTERVENTIONS:  - Assess and monitor vital signs and lab values.  - Assess fundus and lochia.  - Provide ice/sitz baths for perineum discomfort.  - Monitor healing of incision/episiotomy/laceration, and assess for signs and symptoms of infection and hematoma.  - Assess bladder function and monitor for bladder distention.  - Provide/instruct/assist with pericare as needed.  - Provide VTE prophylaxis as needed.  - Monitor bowel function.  - Encourage ambulation and provide assistance as needed.  - Assess and monitor emotional status and provide social service/psych resources as needed.  - Utilize standard precautions and use personal protective equipment as indicated. Ensure aseptic care of all intravenous lines and invasive tubes/drains.  - Obtain immunization and exposure to communicable diseases history.  Outcome: Progressing  Goal: Optimize infant feeding at the breast  Description: INTERVENTIONS:  - Initiate breast feeding within first hour after birth.   - Monitor effectiveness of current breast feeding efforts.  - Assess support systems available to mother/family.  - Identify cultural beliefs/practices regarding lactation, letdown techniques, maternal food preferences.  - Assess mother's knowledge and previous experience with breast feeding.  - Provide information as needed about early infant feeding cues (e.g., rooting, lip smacking, sucking fingers/hand) versus late cue of crying.  - Discuss/demonstrate  breast feeding aids (e.g., infant sling, nursing footstool/pillows, and breast pumps).  - Encourage mother/other family members to express feelings/concerns, and actively listen.  - Educate father/SO about benefits of breast feeding and how to manage common lactation challenges.  - Recommend avoidance of specific medications or substances incompatible with breast feeding.  - Assess and monitor for signs of nipple pain/trauma.  - Instruct and provide assistance with proper latch.  - Review techniques for milk expression (breast pumping) and storage of breast milk. Provide pumping equipment/supplies, instructions and assistance, as needed.  - Encourage rooming-in and breast feeding on demand.  - Encourage skin-to-skin contact.  - Provide LC support as needed.  - Assess for and manage engorgement.  - Provide breast feeding education handouts and information on community breast feeding support.   Outcome: Progressing  Goal: Establishment of adequate milk supply with medication/procedure interruptions  Description: INTERVENTIONS:  - Review techniques for milk expression (breast pumping).   - Provide pumping equipment/supplies, instructions, and assistance until it is safe to breastfeed infant.  Outcome: Progressing  Goal: Appropriate maternal -  bonding  Description: INTERVENTIONS:  - Assess caregiver- interactions.  - Assess caregiver's emotional status and coping mechanisms.  - Encourage caregiver to participate in  daily care.  - Assess support systems available to mother/family.  - Provide /case management support as needed.  Outcome: Progressing

## 2024-12-25 NOTE — PROGRESS NOTES
[facility]  Post-Partum Caesarean Section Progress Note    Cindy Mcmillan Patient Status:  Inpatient    3/1/1985 MRN [id number]   Location [facility] 2SW-J Attending Sherry Fernandes MD   Hosp Day # 2 PCP No primary care provider on file.     SUBJECTIVE:    Postpartum Day 1:  Delivery    The patient feels well. The patient denies emotional concerns. Pain is well controlled with current medications. Urinary output is adequate.  The patient is tolerating a  diet.     OBJECTIVE:    Vital signs in last 24 hours:  Temp:  [97.7 °F (36.5 °C)-98.4 °F (36.9 °C)] 98.4 °F (36.9 °C)  Pulse:  [] 87  Resp:  [14-22] 16  BP: ()/(48-83) 110/55  SpO2:  [93 %-100 %] 97 %    Input/Output:  I/O last 3 completed shifts:  In: 5061.5 [P.O.:1160; I.V.:3701.5; IV PIGGYBACK:200]  Out: 1795 [Urine:1200; Blood:595]     General:    alert, appears stated age, and cooperative   Uterine Fundus:   firm below the umbilicus   Incision:  Dressing in place with no strikethrough    DVT Evaluation:  No evidence of DVT seen on physical exam.     Data Reviewed:  Recent Labs   Lab 24  1021   RBC 4.41   HGB 12.9   HCT 37.9   MCV 85.9   MCH 29.3   MCHC 34.0   RDW 14.6   NEPRELIM 4.69   WBC 7.0   .0             ASSESSMENT/PLAN:    Status post  section.   CBC this morning.Doing well postoperatively.   Continue current care.    Ekaterina Kim MD  2024

## 2024-12-26 VITALS
BODY MASS INDEX: 33 KG/M2 | RESPIRATION RATE: 16 BRPM | OXYGEN SATURATION: 97 % | HEART RATE: 78 BPM | TEMPERATURE: 98 F | WEIGHT: 201 LBS | DIASTOLIC BLOOD PRESSURE: 76 MMHG | SYSTOLIC BLOOD PRESSURE: 116 MMHG

## 2024-12-26 RX ORDER — IBUPROFEN 600 MG/1
600 TABLET, FILM COATED ORAL EVERY 6 HOURS
Qty: 30 TABLET | Refills: 1 | Status: SHIPPED | OUTPATIENT
Start: 2024-12-26

## 2024-12-26 RX ORDER — GABAPENTIN 300 MG/1
300 CAPSULE ORAL EVERY 8 HOURS SCHEDULED
Qty: 30 CAPSULE | Refills: 0 | Status: SHIPPED | OUTPATIENT
Start: 2024-12-26

## 2024-12-26 NOTE — BH PROGRESS NOTE
Discharge instructions discussed and questions answered accordingly.  Pt states she understands discharge instructions.  Feels confident caring for self and .  ID bands verified.  Pt discharged home in stable condition.

## 2024-12-26 NOTE — DISCHARGE SUMMARY
[facility]  Discharge Summary    Cindy Mcmillan Patient Status:  Inpatient    3/1/1985 MRN [id number]   Location [facility] 2SW-J Attending Sherry Fernandes MD   Hosp Day # 3 PCP No primary care provider on file.     Date of Admission: 2024    Date of Discharge: 24      Admitting Diagnosis: pregnancy  Pregnancy (HCC)  Oligohydramnios      Discharge Diagnosis:   section  Patient Active Problem List   Diagnosis    Incompetent cervix    12 weeks gestation of pregnancy (HCC)    Pregnancy (HCC)       Reason for Admission: IUP at 38 2/7 weeks with oligohydramnios            Hospital Course: 38 yo  who presented at 38 2/7 weeks with oligohydramnios admitted for induction of labor. She was also noted to have variable decelerations on the tracing in the office. On admission her cervix was closed and thick and she was started on cytotec and then given pitocin. She progressed to complete and pushed for 3.5 hours with failure of the fetal head to descend.   She delivered via  section a female infant.  She did well postpartum and went home with the baby on pod 2. She was given instructions and told to return to the office in 2 weeks.         Procedures: Low transverse  section     Complications: none    Disposition: Home or Self Care    Discharge Condition: Stable    Discharge Medications:   Current Discharge Medication List        START taking these medications    Details   gabapentin 300 MG Oral Cap Take 1 capsule (300 mg total) by mouth every 8 (eight) hours.  Qty: 30 capsule, Refills: 0      ibuprofen 600 MG Oral Tab Take 1 tablet (600 mg total) by mouth every 6 (six) hours.  Qty: 30 tablet, Refills: 1           CONTINUE these medications which have NOT CHANGED    Details   prenatal vitamin with DHA 27-0.8-228 MG Oral Cap Take 1 capsule by mouth daily.      Cholecalciferol 125 MCG (5000 UT) Oral Tab Take 1 tablet (5,000 Units total) by mouth daily.      Calcium 250  MG Oral Cap Take by mouth.           STOP taking these medications       Ferrous Sulfate (IRON) 325 (65 Fe) MG Oral Tab        PROGESTERONE 200 MG Oral Cap        progesterone 100 MG Oral Cap        ondansetron 4 MG Oral Tablet Dispersible                Diet: general  Activity: pelvic rest and no heavy lifting          Suzanne Rivera MD  12/26/2024  12:01 PM

## 2024-12-26 NOTE — PROGRESS NOTES
POD2    S: doing well, lochia minimal, pain is well managed, he is pumping, she is passing flatus and has no emotional concerns  O: /76 (BP Location: Left arm)   Pulse 78   Temp 97.5 °F (36.4 °C) (Oral)   Resp 16   Wt 201 lb (91.2 kg)   SpO2 97%   Breastfeeding Yes   BMI 33.45 kg/m²   Recent Labs   Lab 12/23/24  1021 12/25/24  0806   RBC 4.41 4.07   HGB 12.9 11.7*   HCT 37.9 35.1   MCV 85.9 86.2   MCH 29.3 28.7   MCHC 34.0 33.3   RDW 14.6 14.8   NEPRELIM 4.69 12.32*   WBC 7.0 16.2*   .0 185.0       General: alert and oriented x3  Respiratory: non labored breathing  Abdomen: soft and nt  Wound: clean dry and intact  Extremities: minimal edema, no evidence of dvt      A: s/p ltcs for FTD POD2      Anemia      Stable  P: discharge to home today with the baby      Instructions reviewed with her

## 2024-12-30 ENCOUNTER — TELEPHONE (OUTPATIENT)
Dept: OBGYN UNIT | Facility: HOSPITAL | Age: 39
End: 2024-12-30

## (undated) NOTE — LETTER
2023      Kenzie Franco MD  [address] [facility] [address]  [id number]  Via Fax: [phone]  Patient: Roque Solis  : 3/1/1985    Dear Macrina Hamilton: Thank you for referring your patient to me for a Maternal Fetal Medicine evaluation. Please see my attached note for my findings and recommendations. Should you have any questions or concerns, please do not hesitate to contact me at the number listed below. Best Regards,      Naa Chapin MD  [address]  Karli Ramirez [phone]    cc: No Recipients      [facility] [address] [facility] [place] [facility] - Department of Maternal Fetal Medicine  Patient Name: Roque Solis  Patient : 3/1/1985  Physician: Naa Chapin MD    Pt here for preconception consultation  No complaints    Outpatient Maternal-Fetal Medicine Consultation    Dear Dr. Fran Mayo,    Thank you for requesting maternal fetal medicine consultation on your patient Roque Solis. As you are aware she is a 41year old female with a history of fetal loss due to incompetent cervix and her most recent pregnancy. A maternal-fetal medicine consultation was requested secondary to her poor OB history. Her prenatal records and labs were reviewed.     HISTORY  OB History     T0    L0    SAB2  IAB2  Ectopic0  Multiple0  Live Births1   # 1 - Date: 03, Sex: None, Weight: None, GA: 7w0d, Delivery: Induced , Apgar1: None, Apgar5: None, Living: None, Birth Comments: Medical termination of pregnancy due to hyperemesis    # 2 - Date: 08, Sex: None, Weight: None, GA: 8w0d, Delivery: Induced , Apgar1: None, Apgar5: None, Living: None, Birth Comments: Medical termination of pregnancy due to hyperemesis    # 3 - Date: 2013, Sex: None, Weight: None, GA: 12w0d, Delivery: Spontaneous , Agnieszka Clam: None, Apgar5: None, Living: None, Birth Comments: None    # 4 - Date: 21, Sex: None, Weight: None, GA: 9w0d, Delivery: None, Apgar1: None, Apgar5: None, Living: None, Birth Comments: Missed  with a crown-rump length of 8 weeks. Diagnosed at 9 weeks 5 days. # 5 - Date: 23, Sex: Female, Weight: None, GA: 21w6d, Delivery: Normal spontaneous vaginal delivery, Apgar1: None, Apgar5: None, Living:  Demise, Birth Comments: Incompetent cervix    # 6 - Date: None, Sex: None, Weight: None, GA: None, Delivery: None, Apgar1: None, Apgar5: None, Living: None, Birth Comments: None    Past Medical History  The patient  has a past medical history of BMI 30.0-30.9,adult. Past Surgical History  The patient  has a past surgical history that includes d&c after delivery (2023).  - D&C with missed AB    Family History  The patient She indicated that the status of her mother is unknown. She indicated that her maternal aunt is alive. Medications:   Current Outpatient Medications:     prenatal vitamin with DHA 27-0.8-228 MG Oral Cap, Take 1 capsule by mouth daily. , Disp: , Rfl:     Calcium 250 MG Oral Cap, Take by mouth., Disp: , Rfl:     Cholecalciferol 125 MCG (5000 UT) Oral Tab, Take 1 tablet (5,000 Units total) by mouth daily. , Disp: , Rfl:   Allergies: No Known Allergies      PHYSICAL EXAMINATION:  /87 (BP Location: Right arm, Patient Position: Sitting, Cuff Size: large)   Pulse 64   General: alert and oriented,no acute distress      DISCUSSION  During her visit we discussed and reviewed the following issues:  [facility]  History  She had a history of pregnancy termination x2 for severe hyperemesis. She also had a history of a missed AB around 9 weeks gestation. She reports a history of a home delivery after about 3 months gestation. 23 -vaginal delivery of her daughter Meghann Yadav who was born at 24+ week.  demise. She had seen [facility] in that pregnancy for level 2 ultrasound on 3/20/23.   The fetal anatomy was normal but it was noted that she had a short funneling cervix with debris noted in the fundal.  The measurable cervical length was 9 mm. She was started on vaginal Crinone 90 mg nightly. She was evaluated in labor and delivery on 2023 complaining of contractions. It was reported that there were no contractions on the monitor and her sterile vaginal exam was fingertip thick and -3 station they are short for she was discharged home. Later that night she presented with contractions and vaginal bleeding and was admitted. Her cervical exam at that time was a bulging bag and they were not able to reach behind the back to feel the cervical dilatation. Bedside ultrasound was performed did not find that the fetus was in a footling breech position with hourglassing membranes and the internal os was dilated 1.43 cm. She was kenn every 2 to 3 minutes and was treated with expectant management based on her gestational age and eminent delivery. Prior in the pregnancy she had had significant hyperemesis and had multiple hospitalization and IV infusions. Discussion:  Discussed with the patient that her presentation with the short funneling cervix and later progression into labor is consistent with an incompetent cervix. We discussed different treatment methods for incompetent cervix. Specifically we discussed prophylactic vaginal Bell cerclage between 12 and 14 weeks, serial cervical length ultrasound and ultrasound indicated placement of cervical cerclage, and we discussed prepregnancy placement of an abdominal cerclage. We discussed success rates of each of these treatment plans as well as risks. Cervical cerclage refers to a variety of surgical procedures in which sutures, wires, or synthetic tape are used to reinforce the cervix.  These procedures are intended to mechanically increase the tensile strength of the cervix, thereby reducing the occurrence of adverse  events associated with cervical insufficiency (ie, relatively painless cervical changes that occur in the second trimester and result in recurrent pregnancy loss). Not all women are good candidates for cervical cerclage; the major contraindications are fetal anomaly incompatible with life, intrauterine infection, active bleeding, active  labor, premature rupture of membranes, and fetal demise. The presence of fetal membranes prolapsing through the external cervical os is a relative contraindication to the procedure because the risk of iatrogenic rupture of the membranes in this setting may exceed 50 percent. Several techniques (eg, Trendelenburg position, back-filling the maternal bladder, decompression amniocentesis) have been used to make the fetal membranes retract prior to cerclage placement, with variable results     The lower and upper limits of gestational age for performing the procedure are not well defined. Waiting at least until the end of the first trimester also permits evaluation for some fetal anomalies, first trimester aneuploidy screening, or chorionic villus sampling prior to the procedure, if indicated. Most cerclages are placed via a vaginal approach. The transabdominal approach is more invasive, but allows higher placement since the transabdominal cerclage can be placed at the cervicoisthmic portion of the uterus, while transvaginal cerclages often end up distal to the internal os. In either case, the object is to reinforce the cervix at the level of the internal os. Lengthening of the cervix is a secondary effect. The length of cervix distal to the cerclage (ie, cerclage to external os) may not be an important factor in determining subsequent pregnancy outcome, but the length of closed cervix proximal to the cerclage appears to be predictive: a length ? 10 mm is desirable     Cervical cerclage is the conventional treatment for cervical insufficiency, despite the paucity of data from randomized trials proving its efficacy.  Most case series report a viable birth rate of 70 to 90 percent after elective cerclage, as compared with 10 to 30 percent prior to the procedure. However, using patients as their own controls (ie, pregnancy success rate is compared before and after cerclage) is subject to bias since changes in the patient and her management other than cerclage may have accounted for the higher rate of success in the subsequent pregnancy. In fact, trials in which women were randomly assigned to undergo cerclage or no cerclage report much higher live birth rates in the untreated group than observed in historic controls. The timing of cerclage also affects outcome. There is universal agreement that emergency cerclage performed in the presence of advanced cervical changes and prolapsed membranes has the worst outcome. It should be appreciated that there is significant risk that an emergency procedure may convert a previable birth to a very low birthweight premature birth (25 to 32 weeks) with the potential for serious long-term neurodevelopmental disability. Transabdominal placement of a cerclage at the cervicoisthmic junction appears to be a safe and effective procedure for reducing the incidence of spontaneous pregnancy loss in selected patients with cervical insufficiency. Potential advantages of transabdominal over transvaginal cerclage are more proximal placement of the stitch (at the level of the internal os), decreased risk of suture migration, absence of a foreign body in the vagina that could promote infection, and the ability to leave the suture in place for future pregnancies. A disadvantage of this approach is the potential need for two laparotomies during pregnancy (one to place the cerclage and potentially another to remove it). There are no studies comparing the outcome of transabdominal and transvaginal cerclage in similar populations of patients. Transabdominal cerclage is a more morbid procedure than transvaginal cerclage.  It usually requires a laparotomy for placement and delivery by . For these reasons, most experts recommend reserving the transabdominal approach for women with cervical insufficiency who have either failed two or more previous transvaginal cerclages or in whom a transvaginal cerclage is technically impossible to perform due to extreme shortening, scarring, or laceration of the cervix. Whether to remove the cerclage if PPROM occurs is a matter of debate. One concern is that removal of the cerclage will lead to earlier delivery; however, retention of the foreign body may increase the risk of infectious morbidity. Several observational studies have addressed the management of such patients, with inconsistent results. The gestational age at PPROM was the most important determinant of  outcome. Based on the available, limited data and our own clinical experience, we remove the cerclage in women with PPROM if (1) there is any evidence of chorioamnionitis or (2) the pregnancy is at least 32 weeks of gestation. In the absence of clinically apparent infection, we feel the possible increased risk of premature delivery with cerclage removal before 32 weeks outweighs the possible increased risk of ascending infection if the cerclage is left in place. After our discussion Cindy call me that she is leaning towards having a vaginal cerclage placed after the first trimester is complete. ADVANCED MATERNAL AGE    Background  I reviewed with the patient that pregnancies in women of advanced maternal age (28 or older at delivery) are associated with elevated risks. Specifically, there is a higher rate of:  Fetal malformations  Preeclampsia  Gestational diabetes  Intrauterine fetal death    As a result, enhanced pregnancy surveillance is advised for these patients including a comprehensive ultrasound to assess for fetal malformations (at 20 weeks) and a third trimester ultrasound assessment for fetal growth (at 32 weeks).  In addition, weekly NST's (initiating at 39 weeks gestation for women 35-39 years and at 28 weeks gestation for women 40 years and older) are also advised. Routine obstetric care is more than adequate to assess for gestational diabetes and preeclampsia; hence, no further significant alterations in obstetric care are advised. Medical Complications    Women 28years of age or older can expect to experience two to three fold higher rates of hospitalization,  delivery, and pregnancy-related complications when compared to their younger counterparts. The two most common medical problems complicating these  pregnanccies are hypertension and diabetes. The incidence of preeclampsia in the general obstetric population is 3 to 4 percent; this increases to 5 to 10 percent in women over age 36 and is as high as 28 percent in women over age 48. The incidence of gestational diabetes in the general obstetric population is 3 percent, rising to 7 to 15 percent in women over age 36 and 21 percent in women over age 48. Women 28years of age or older are more likely to be delivered by . The  delivery rate in the general obstetric population of the [facility] is almost 30 percent, compared to almost 48 percent in women over age 36 to 39 and almost [de-identified] percent in women age 48 to 61. Fetal Death        A decision analysis tool using data from the [place] [employer]  [employer] predicted a strategy of weekly antepartum testing and labor induction would lower the risk of unexplained fetal death in women 28years of age or older. In this model, weekly testing starting at 36 weeks of gestation would drop the risk of fetal death from 5.2 to 1.3 per 1000 pregnancies.  While a policy of antepartum testing in older women does increase the chance that a women will be induced (71 inductions per fetal death averted) and thereby increases her risk of having a  delivery, only 14 additional cesareans would need to be performed to avert one unexplained fetal death. Hence, weekly NST's are advised for women of advanced maternal age; testing should be initiated at 42 weeks for women 35-39 years and at 26 weeks for women 36 years and older. Fetal Malformations    Cardiac malformations, clubfoot, and diaphragmatic hernia appear to occur with increased frequency in offspring of older women. These abnormalities are structural and unrelated to aneuploidy, thus they would not be detected by karyotype analysis. For these reasons a complete, detailed ultrasound (level II) is advised even if the fetus has a normal karyotype. Fetal Aneuploidy  We also discussed the increased risk of chromosomal abnormalities associated with advanced maternal age. I reviewed that an ultrasound examination cannot reliably exclude potential genetic abnormalities. Invasive Testing  I offered invasive genetic testing (amniocentesis, chorionic villus sampling) after reviewing the diagnostic accuracy of these tests as well as the procedure associated loss rate (1:500 for genetic amniocentesis). She ultimately does not desire invasive genetic testing. Non-invasive Pregnancy Testing (NIPT)  I reviewed current non-invasive screening options. Currently non-invasive pregnancy testing (NIPT) offers the highest detection rate (with the lowest false positive rate) for the detection of fetal aneuploidy amongst high-risk patients. The limitations of detailed mid-trimester sonography was reviewed with the patient. First trimester screening and second trimester multiple-marker serum serum screening as alternative aneuploidy screening options were also reviewed. However, both of these tests are associated with lower detection and higher false positive rates. High BMI -she is currently trying to modify her lifestyle to improve her diet and to focus on movement. She does like to walk and generally take walks if the weather is good.   Since her delivery, she has reduced fried foods. She has not had red meat in a number of years and rarely eats poultry or other meat products. She is interested in tries to learn about nutrition. We discussed briefly different macronutrients and the importance of adequate protein. We discussed good plant-based sources of protein as well as dairy and egg sources. She does not eat much in the way of cheese and she does not eat seafood of any sort. We discussed the current recommendations for limited gestational weight gain in pregnancy for overweight and obese women. The [place] [employer] currently recommends that women keep gestational weight gain to between 8-18 lbs. We discussed the role of mild to moderate exercise, healthy food choices and appropriate portions sized to help achieve this goal.  Excess weight gain is associated with higher rates of gestational diabetes, hypertensive complications, fetal macrosomia and delivery complications. Women with weight loss or insufficient weight gain have higher rates of small for gestational age infants. Adolfo Castaneda had her questions answered to her satisfaction. She seems optimistic about a successful pregnancy with a cervical cerclage in the future. IMPRESSION:  Incompetent cervix based on her recent obstetrical history  History of hyperemesis  Advanced maternal age  Class I obesity  History of 2 prior D&Cs    RECOMMENDATIONS:  Continue care with Dr. Jonathan Jones  In her next pregnancy, plan on sending her to [facility] by 12 weeks for an NT ultrasound and planning of her cervical cerclage.   I would strongly advise that she follow-up with [facility] for cervical length in the second trimester, begin nightly vaginal progesterone 200 mg capsule per night starting at 16 weeks, and routine advanced maternal age care    Total time spent 60 minutes this calendar day which includes preparing to see the patient including chart review, obtaining and/or reviewing additional medical history, performing a physical exam and evaluation, documenting clinical information in the electronic medical record, independently interpreting results, counseling the patient, communicating results to the patient/family/caregiver and coordinating care. Case discussed with patient who demonstrated understanding and agreement with plan. Thank you for allowing me to participate in the care of this patient. Please feel free to contact me with any questions. Valentín Majano MD  Maternal-Fetal Medicine       Note to patient and family:  The [id number] makes medical notes available to patients in the interest of transparency. However, please be advised that this is a medical document. It is intended as a peer to peer communication. It is written in medical language and may contain abbreviations or verbiage that are technical and unfamiliar. It may appear blunt or direct. Medical documents are intended to carry relevant information, facts as evident, and the clinical opinion of the practitioner.

## (undated) NOTE — LETTER
2024      Ekaterina Kim MD  [address]  [address]  [facility] [address]  Via Fax: [phone]  Patient: Cindy Mcmillan  : 3/1/1985    Dear Colleague:  Thank you for referring your patient to me for a Maternal Fetal Medicine evaluation. Please see my attached note for my findings and recommendations.      Should you have any questions or concerns, please do not hesitate to contact me at the number listed below.    Best Regards,      Martha Oliveira MD  [facility]  [address]  [facility] [id number] [phone]    cc: No Recipients      [facility] Department of Maternal Fetal Medicine  Patient Name: Cindy Mcmillan  Patient : 3/1/1985  Physician: Martha Oliveira MD    Outpatient Maternal-Fetal Medicine Follow-Up     Dear Dr. Kim,     Thank you for requesting ultrasound evaluation and maternal fetal medicine consultation on your patient Cindy Mcmillan.  As you are aware she is a 39 year old female  with a Werner pregnancy and an Estimated Date of Delivery: 24.  She returned to maternal-fetal medicine today for a follow-up visit.  Her history was reviewed from her prior visit and there were no interval changes.     Antepartum Risk Factors  Advanced maternal age  History of incompetent cervix, status post cervical cerclage on 2024 (Dr. Oliveira)      S: No pain or problems after the cerclage. We reviewed vaginal progesterone and she would like to start at 15-16 weeks     PHYSICAL EXAMINATION:  /69 (BP Location: Right arm, Patient Position: Sitting, Cuff Size: adult)   Pulse 102   Ht 5' 6\" (1.676 m)   Wt 170 lb (77.1 kg)   Breastfeeding Unknown   BMI 27.44 kg/m²   General: alert and oriented, no acute distress  Abdomen: gravid, soft, non-tender  Extremities: non-tender, no edema     OBSTETRIC ULTRASOUND  The patient had a limited and transvaginal ultrasound today which I interpreted the results and reviewed them with the  patient.    Ultrasound Findings:  Single IUP in transverse presentation.    Placenta is anterior.   A 3 vessel cord is noted.  Cardiac activity is present at 158 bpm  MVP is 3.9 cm    The cervix was not able to be clearly visualized and appeared short by transabdominal ultrasound, therefore transvaginal ultrasound was performed. Transvaginal US findings: Cervix is closed, long and no funneling seen measuring 38 mm     See imaging tab for the complete US report.     DISCUSSION  During her visit we discussed and reviewed the following issues:  DISCUSSION  During her visit we discussed and reviewed the following issues:  ADVANCED MATERNAL AGE    I reviewed with the patient that pregnancies in women of advanced maternal age (35 or older at delivery) are associated with elevated risks. Specifically, there is a higher rate of:  Fetal malformations  Preeclampsia  Gestational diabetes  Intrauterine fetal death     As a result, enhanced pregnancy surveillance is advised for these patients including a comprehensive ultrasound to assess for fetal malformations (at 20 weeks) and a third trimester ultrasound assessment for fetal growth (at 32 weeks). In addition, weekly NST's (initiating at 36 weeks gestation for women 35-39 years and at 32 weeks gestation for women 40 years and older) are also advised. Routine obstetric care is more than adequate to assess for gestational diabetes and preeclampsia; hence, no further significant alterations in obstetric care are advised.  See [facility] note from 6/17/2024 for detailed review.     Fetal Aneuploidy      Invasive Testing  I offered invasive genetic testing (amniocentesis, chorionic villus sampling) after reviewing the diagnostic accuracy of these tests as well as the procedure associated loss rate (1:500 for genetic amniocentesis).        We discussed  the increased risk of chromosomal abnormalities associated with advanced maternal age at age 39 year old. She understands that ultrasound  exam cannot exclude potential genetic abnormalities.  Her estimated risk based on maternal age at amniocentesis with any chromosome abnormality is about 1:60  and with Down Syndrome is about 1:110 .   We also discussed the risks and benefits of having  genetic testing ([facility] and amniocentesis) performed.       Non-invasive Pregnancy Testing (NIPT)  I reviewed current non-invasive screening options. Currently non-invasive pregnancy testing (NIPT) offers the highest detection rate (with the lowest false positive rate) for the detection of fetal aneuploidy amongst high-risk patients. The limitations of detailed mid-trimester sonography was reviewed with the patient. First trimester screening and second trimester multiple-marker serum serum screening as alternative aneuploidy screening options were also reviewed. However, both of these tests are associated with lower detection and higher false positive rates.     =================        PRIOR  BIRTH  History  She had a history of pregnancy termination x2 for severe hyperemesis.  She also had a history of a missed AB around 9 weeks gestation.  She reports a history of a home delivery after about 3 months gestation.     23 -vaginal delivery of her daughter Mike who was born at 21+ week.   demise.  She had seen [facility] in that pregnancy for level 2 ultrasound on 3/20/23.  The fetal anatomy was normal but it was noted that she had a short funneling cervix with debris noted in the fundal.  The measurable cervical length was 9 mm.  She was started on vaginal Crinone 90 mg nightly.  She was evaluated in labor and delivery on 2023 complaining of contractions.  It was reported that there were no contractions on the monitor and her sterile vaginal exam was fingertip thick and -3 station they are short for she was discharged home.  Later that night she presented with contractions and vaginal bleeding and was admitted.  Her cervical exam at that time was a  bulging bag and they were not able to reach behind the back to feel the cervical dilatation.  Bedside ultrasound was performed did not find that the fetus was in a footling breech position with hourglassing membranes and the internal os was dilated 1.43 cm.  She was kenn every 2 to 3 minutes and was treated with expectant management based on her gestational age and eminent delivery.     Prior in the pregnancy she had had significant hyperemesis and had multiple hospitalization and IV infusions.    Cerclage -   See prior [facility] notes for detailed review.  We reviewed activity restrictions with cervical cerclage.    We discussed the role of vaginal progesterone with cervical cerclage.  She recalls having this discussion with Dr. Alamo prior to the cerclage and she would like to go on this therapy.        High BMI:  Her BMI prior to pregnancy was 28  Obesity/ overweight during pregnancy is associated with numerous maternal and  risks which were discussed previously and reviewed.  We discussed the current recommendations for limited gestational weight gain in pregnancy for overweight and obese women.  The [place] of Medicine currently recommends that women keep gestational weight gain to between 8-18 lbs.  We discussed the role of mild to moderate exercise, healthy food choices and appropriate portions sized to help achieve this goal.  Excess weight gain is associated with higher rates of gestational diabetes, hypertensive complications, fetal macrosomia and delivery complications.  Women with weight loss or insufficient weight gain have higher rates of small for gestational age infants.    See [facility] note from 2024 for detailed review       Cindy had her questions answered to her satisfaction.  She seems optimistic about a successful pregnancy with a cervical cerclage in the future.            IMPRESSION:  IUP at 13w3d  History of incompetent cervix -status post cerclage which is in good  position  Advanced maternal age  High BMI     RECOMMENDATIONS:  Continue care with Dr. Kim  Weekly NST at 36 weeks  Fetal growth and BPP at 32 weeks  Limit gestational weight gain to less than 20 pounds  Level 2 ultrasound and cervical length at 20 weeks  Vaginal progesterone 200 mg capsules nightly starting at 16 weeks and continue until cerclage is removed  Plan cerclage removal at 36-37 weeks     Total time spent 30 minutes this calendar day which includes preparing to see the patient including chart review, obtaining and/or reviewing additional medical history, performing a physical exam and evaluation, documenting clinical information in the electronic medical record, independently interpreting results, counseling the patient, communicating results to the patient/family/caregiver and coordinating care.     Case discussed with patient who demonstrated understanding and agreement with plan.     Thank you for allowing me to participate in the care of this patient.  Please feel free to contact me with any questions.    Martha Oliveira MD  Maternal-Fetal Medicine       Note to patient and family:  The 21st Century Cures Act makes medical notes available to patients in the interest of transparency.  However, please be advised that this is a medical document.  It is intended as a peer to peer communication.  It is written in medical language and may contain abbreviations or verbiage that are technical and unfamiliar.  It may appear blunt or direct.  Medical documents are intended to carry relevant information, facts as evident, and the clinical opinion of the practitioner.      Pt here for cervical length  No complaints

## (undated) NOTE — LETTER
2024      Ekaterina Kim MD  [address]  [address]  [facility] [address]  Via Fax: [phone]  Patient: Cindy Mcmillan  : 3/1/1985    Dear Colleague:  Thank you for referring your patient to me for a Maternal Fetal Medicine evaluation. Please see my attached note for my findings and recommendations.      Should you have any questions or concerns, please do not hesitate to contact me at the number listed below.    Best Regards,      Reymundo Alamo, [facility]  [address]  [facility] [id number] [phone]    cc: No Recipients      [facility] Department of Maternal Fetal Medicine  Patient Name: Cindy Mcmillan  Patient : 3/1/1985  Physician: Reymundo Alamo, DO    Pt here for Level II Ultrasound  +flutters noted per patient  Pt denies complaints.     Outpatient Maternal-Fetal Medicine Follow-Up     Dear Dr. Kim,     Thank you for requesting ultrasound evaluation and maternal fetal medicine consultation on your patient Cindy Mcmillan.  As you are aware she is a 39 year old female  with a Werner pregnancy and an Estimated Date of Delivery: 24.  She returned to maternal-fetal medicine today for a follow-up visit.  Her history was reviewed from her prior visit and there were no interval changes.     Antepartum Risk Factors  Advanced maternal age  History of incompetent cervix, status post cervical cerclage on 2024 (Dr. Oliveira)      S: No pain or problems after the cerclage.     PHYSICAL EXAMINATION:  /63 (BP Location: Right arm, Patient Position: Sitting, Cuff Size: adult)   Pulse 110   Ht 5' 6\" (1.676 m)   Wt 179 lb (81.2 kg)   BMI 28.89 kg/m²   General: alert and oriented, no acute distress  Abdomen: gravid, soft, non-tender  Extremities: non-tender, no edema          DISCUSSION  During her visit we discussed and reviewed the following issues:  DISCUSSION  During her visit we discussed and reviewed the following  issues:  ADVANCED MATERNAL AGE    I reviewed with the patient that pregnancies in women of advanced maternal age (35 or older at delivery) are associated with elevated risks. Specifically, there is a higher rate of:  Fetal malformations  Preeclampsia  Gestational diabetes  Intrauterine fetal death     As a result, enhanced pregnancy surveillance is advised for these patients including a comprehensive ultrasound to assess for fetal malformations (at 20 weeks) and a third trimester ultrasound assessment for fetal growth (at 32 weeks). In addition, weekly NST's (initiating at 36 weeks gestation for women 35-39 years and at 32 weeks gestation for women 40 years and older) are also advised. Routine obstetric care is more than adequate to assess for gestational diabetes and preeclampsia; hence, no further significant alterations in obstetric care are advised.  See [facility] note from 6/17/2024 for detailed review.     Fetal Aneuploidy      Invasive Testing  I offered invasive genetic testing (amniocentesis, chorionic villus sampling) after reviewing the diagnostic accuracy of these tests as well as the procedure associated loss rate (1:500 for genetic amniocentesis).        We discussed  the increased risk of chromosomal abnormalities associated with advanced maternal age at age 39 year old. She understands that ultrasound exam cannot exclude potential genetic abnormalities.  Her estimated risk based on maternal age at amniocentesis with any chromosome abnormality is about 1:60  and with Down Syndrome is about 1:110 .   We also discussed the risks and benefits of having  genetic testing (CVS and amniocentesis) performed.       Non-invasive Pregnancy Testing (NIPT)  I reviewed current non-invasive screening options. Currently non-invasive pregnancy testing (NIPT) offers the highest detection rate (with the lowest false positive rate) for the detection of fetal aneuploidy amongst high-risk patients. The limitations of detailed  mid-trimester sonography was reviewed with the patient. First trimester screening and second trimester multiple-marker serum serum screening as alternative aneuploidy screening options were also reviewed. However, both of these tests are associated with lower detection and higher false positive rates.     =================        PRIOR  BIRTH  History  She had a history of pregnancy termination x2 for severe hyperemesis.  She also had a history of a missed AB around 9 weeks gestation.  She reports a history of a home delivery after about 3 months gestation.     23 -vaginal delivery of her daughter Mike who was born at 21+ week.   demise.  She had seen [facility] in that pregnancy for level 2 ultrasound on 3/20/23.  The fetal anatomy was normal but it was noted that she had a short funneling cervix with debris noted in the fundal.  The measurable cervical length was 9 mm.  She was started on vaginal Crinone 90 mg nightly.  She was evaluated in labor and delivery on 2023 complaining of contractions.  It was reported that there were no contractions on the monitor and her sterile vaginal exam was fingertip thick and -3 station they are short for she was discharged home.  Later that night she presented with contractions and vaginal bleeding and was admitted.  Her cervical exam at that time was a bulging bag and they were not able to reach behind the back to feel the cervical dilatation.  Bedside ultrasound was performed did not find that the fetus was in a footling breech position with hourglassing membranes and the internal os was dilated 1.43 cm.  She was kenn every 2 to 3 minutes and was treated with expectant management based on her gestational age and eminent delivery.     Prior in the pregnancy she had had significant hyperemesis and had multiple hospitalization and IV infusions.    Cerclage -   See prior [facility] notes for detailed review.  We reviewed activity restrictions with cervical  cerclage.    We discussed the role of vaginal progesterone with cervical cerclage.  She recalls having this discussion with Dr. Alamo prior to the cerclage and she would like to go on this therapy.        High BMI:  Her BMI prior to pregnancy was 28  Obesity/ overweight during pregnancy is associated with numerous maternal and  risks which were discussed previously and reviewed.  We discussed the current recommendations for limited gestational weight gain in pregnancy for overweight and obese women.  The [place] of Medicine currently recommends that women keep gestational weight gain to between 8-18 lbs.  We discussed the role of mild to moderate exercise, healthy food choices and appropriate portions sized to help achieve this goal.  Excess weight gain is associated with higher rates of gestational diabetes, hypertensive complications, fetal macrosomia and delivery complications.  Women with weight loss or insufficient weight gain have higher rates of small for gestational age infants.    See [facility] note from 2024 for detailed review       Cindy had her questions answered to her satisfaction.  She seems optimistic about a successful pregnancy with a cervical cerclage in the future.         OB ULTRASOUND REPORT   See imaging tab for complete ultrasound report or in PACS    Single IUP in breech presentation.    Placenta is right lateral.   A 3 vessel cord is noted.  Cardiac activity is present at 159 bpm   g ( 0 lb 13 oz);   MVP is 4.2 cm    The cervix was not able to be clearly visualized and appeared short by transabdominal ultrasound, therefore transvaginal ultrasound was performed. Transvaginal US findings: Cervix is closed, long and no funneling seen measuring 34.6 mm   Uterus and adnexa appeared normal today on ultrasound      Fetal Anatomy:  Visualized with normal appearance: head, face, spine, neck, skin, chest, abdominal wall, gastrointestinal tract, kidneys, bladder, extremities.    Brain: Visualized and normal appearances: brain parenchyma, cerebral ventricles, choroid plexus, Cisterna Magna, midline falx, cerebellum, cerebellar lobes, posterior fossa, vermis, cavum septi pellucidi.  Face: eyes normal, profile normal, nose normal, lip normal, palate normal.  Heart: visualized and normal appearance: 3 vessel view, four-chamber, left outflow tract, right outflow tract, arches.      Genetic Sonogram:  Nuchal fold normal.  Pylelectasis absent.  No hyperechogenic bowel.  Echogenic intracardiac foci absent. Nasal bone present. Choroid plexus cyst absent.      Summary of Ultrasound findings:  This is a Werner pregnancy    The fetal measurements are consistent with established EDC. No gross ultrasound evidence of structural abnormalities are seen today. No minor markers for aneuploidy are seen. The patient understands that ultrasound cannot rule out all structural and chromosomal abnormalities.       IMPRESSION:   1. IUP @  20w2d  2. Scan consistent with dates  3. No fetal structural abnormalities seen  4. History of incompetent cervix -status post cerclage which is in good position  5.  Advanced maternal age  6.  High BMI    RECOMMENDATIONS:     Continue care with Dr. Kim  Weekly NST at 36 weeks  Fetal growth and BPP at 32 weeks  Limit gestational weight gain to less than 20 pounds  Vaginal progesterone 200 mg capsules nightly starting at 16 weeks and continue until cerclage is removed  Plan cerclage removal at 36-37 weeks     Total time spent 30 minutes this calendar day which includes preparing to see the patient including chart review, obtaining and/or reviewing additional medical history, performing a physical exam and evaluation, documenting clinical information in the electronic medical record, independently interpreting results, counseling the patient, communicating results to the patient/family/caregiver and coordinating care.     Case discussed with patient who demonstrated  understanding and agreement with plan.     Thank you for allowing me to participate in the care of this patient.  Please feel free to contact me with any questions.    Reymundo Alamo D.O.  Maternal Fetal Medicine        Note to patient and family:  The 21st Century Cures Act makes medical notes available to patients in the interest of transparency.  However, please be advised that this is a medical document.  It is intended as a peer to peer communication.  It is written in medical language and may contain abbreviations or verbiage that are technical and unfamiliar.  It may appear blunt or direct.  Medical documents are intended to carry relevant information, facts as evident, and the clinical opinion of the practitioner.

## (undated) NOTE — LETTER
2024      Kylah Sy MD  [address]  [address]  [facility] [address]  Via Fax: [phone]  Patient: Cindy Mcmillan  : 3/1/1985    Dear Colleague:  Thank you for referring your patient to me for a Maternal Fetal Medicine evaluation. Please see my attached note for my findings and recommendations.      Should you have any questions or concerns, please do not hesitate to contact me at the number listed below.    Best Regards,      Reymundo Alamo,   [facility]  [address]  [facility] [id number] [phone]    cc: No Recipients      [facility] Department of Maternal Fetal Medicine  Patient Name: Cindy Mcmillan  Patient : 3/1/1985  Physician: Reymundo Alamo DO    Outpatient Maternal-Fetal Medicine Consultation    Dear Dr. Kim,    Thank you for requesting maternal fetal medicine consultation and ultrasound on your patient Cindy Mcmillan.  As you are aware she is a 39 year old female with a history of fetal loss due to incompetent cervix and her most recent pregnancy.  A maternal-fetal medicine consultation was requested secondary to her poor OB history and AMA.  Her prenatal records and labs were reviewed.    HISTORY  OB History    Para Term  AB Living   6 1 0 1 4 0   SAB IAB Ectopic Multiple Live Births   2 2 0 0 1     # 1 - Date: 03, Sex: None, Weight: None, GA: 7w0d, Type: Induced , Apgar1: None, Apgar5: None, Living: None, Birth Comments: Medical termination of pregnancy due to hyperemesis    # 2 - Date: 08, Sex: None, Weight: None, GA: 8w0d, Type: Induced , Apgar1: None, Apgar5: None, Living: None, Birth Comments: Medical termination of pregnancy due to hyperemesis    # 3 - Date: 2013, Sex: None, Weight: None, GA: 12w0d, Type: Spontaneous , Apgar1: None, Apgar5: None, Living: None, Birth Comments: None    # 4 - Date: 21, Sex: None, Weight: None, GA: 9w0d, Type: None, Apgar1:  None, Apgar5: None, Living: None, Birth Comments: Missed  with a crown-rump length of 8 weeks.  Diagnosed at 9 weeks 5 days.    # 5 - Date: 23, Sex: Female, Weight: None, GA: 21w6d, Type: Normal spontaneous vaginal delivery, Apgar1: None, Apgar5: None, Living:  Demise, Birth Comments: Incompetent cervix    # 6 - Date: None, Sex: None, Weight: None, GA: None, Type: None, Apgar1: None, Apgar5: None, Living: None, Birth Comments: None    Past Medical History  The patient  has a past medical history of BMI 30.0-30.9,adult and Incompetent cervix (2023).    Past Surgical History  The patient  has a past surgical history that includes d&c after delivery (2023).   - D&C with missed AB    Family History  The patient She indicated that the status of her mother is unknown. She indicated that her maternal aunt is alive.      Medications:   Current Outpatient Medications:     prenatal vitamin with DHA 27-0.8-228 MG Oral Cap, Take 1 capsule by mouth daily., Disp: , Rfl:     Calcium 250 MG Oral Cap, Take by mouth., Disp: , Rfl:     Cholecalciferol 125 MCG (5000 UT) Oral Tab, Take 1 tablet (5,000 Units total) by mouth daily., Disp: , Rfl:   Allergies: No Known Allergies      PHYSICAL EXAMINATION:  /73 (BP Location: Right arm, Patient Position: Sitting, Cuff Size: adult)   Pulse 85   Ht 5' 6\" (1.676 m)   Wt 173 lb (78.5 kg)   BMI 27.92 kg/m²             Abdomen:  soft, nontender, no contractions          extremities:  nontender, no edema          Neuro:  unremarkable        DISCUSSION  During her visit we discussed and reviewed the following issues:  ADVANCED MATERNAL AGE    Background  I reviewed with the patient that pregnancies in women of advanced maternal age (35 or older at delivery) are associated with elevated risks. Specifically, there is a higher rate of:  Fetal malformations  Preeclampsia  Gestational diabetes  Intrauterine fetal death    As a result, enhanced pregnancy  surveillance is advised for these patients including a comprehensive ultrasound to assess for fetal malformations (at 20 weeks) and a third trimester ultrasound assessment for fetal growth (at 32 weeks). In addition, weekly NST's (initiating at 36 weeks gestation for women 35-39 years and at 32 weeks gestation for women 40 years and older) are also advised. Routine obstetric care is more than adequate to assess for gestational diabetes and preeclampsia; hence, no further significant alterations in obstetric care are advised.    Medical Complications    Women 35 years of age or older can expect to experience two to three fold higher rates of hospitalization,  delivery, and pregnancy-related complications when compared to their younger counterparts.  The two most common medical problems complicating these  pregnanccies are hypertension and diabetes.   The incidence of preeclampsia in the general obstetric population is 3 to 4 percent; this increases to 5 to 10 percent in women over age 40 and is as high as 35 percent in women over age 50.   The incidence of gestational diabetes in the general obstetric population is 3 percent, rising to 7 to 12 percent in women over age 40 and 20 percent in women over age 50.  Women 35 years of age or older are more likely to be delivered by . The  delivery rate in the general obstetric population of the [place] is almost 30 percent, compared to almost 50 percent in women over age 40 to 45 and almost 80 percent in women age 50 to 63.          Fetal Death        A decision analysis tool using data from the [place] [employer]  [employer] predicted a strategy of weekly antepartum testing and labor induction would lower the risk of unexplained fetal death in women 35 years of age or older. In this model, weekly testing starting at 36 weeks of gestation would drop the risk of fetal death from 5.2 to 1.3 per 1000 pregnancies. While a policy of antepartum  testing in older women does increase the chance that a women will be induced (71 inductions per fetal death averted) and thereby increases her risk of having a  delivery, only 14 additional cesareans would need to be performed to avert one unexplained fetal death.  Hence, weekly NST's are advised for women of advanced maternal age; testing should be initiated at 36 weeks for women 35-39 years and at 32 weeks for women 40 years and older.    Fetal Malformations    Cardiac malformations, clubfoot, and diaphragmatic hernia appear to occur with increased frequency in offspring of older women. These abnormalities are structural and unrelated to aneuploidy, thus they would not be detected by karyotype analysis.  For these reasons a complete, detailed ultrasound (level II) is advised even if the fetus has a normal karyotype.      Fetal Aneuploidy      Invasive Testing  I offered invasive genetic testing (amniocentesis, chorionic villus sampling) after reviewing the diagnostic accuracy of these tests as well as the procedure associated loss rate (1:500 for genetic amniocentesis).        We discussed  the increased risk of chromosomal abnormalities associated with advanced maternal age at age 39 year old. She understands that ultrasound exam cannot exclude potential genetic abnormalities.  Her estimated risk based on maternal age at amniocentesis with any chromosome abnormality is about 1:60  and with Down Syndrome is about 1:110 .   We also discussed the risks and benefits of having  genetic testing ([facility] and amniocentesis) performed.      Non-invasive Pregnancy Testing (NIPT)  I reviewed current non-invasive screening options. Currently non-invasive pregnancy testing (NIPT) offers the highest detection rate (with the lowest false positive rate) for the detection of fetal aneuploidy amongst high-risk patients. The limitations of detailed mid-trimester sonography was reviewed with the patient. First trimester  screening and second trimester multiple-marker serum serum screening as alternative aneuploidy screening options were also reviewed. However, both of these tests are associated with lower detection and higher false positive rates.    =================      PRIOR  BIRTH  History  She had a history of pregnancy termination x2 for severe hyperemesis.  She also had a history of a missed AB around 9 weeks gestation.  She reports a history of a home delivery after about 3 months gestation.    23 -vaginal delivery of her daughter Mike who was born at 21+ week.   demise.  She had seen [facility] in that pregnancy for level 2 ultrasound on 3/20/23.  The fetal anatomy was normal but it was noted that she had a short funneling cervix with debris noted in the fundal.  The measurable cervical length was 9 mm.  She was started on vaginal Crinone 90 mg nightly.  She was evaluated in labor and delivery on 2023 complaining of contractions.  It was reported that there were no contractions on the monitor and her sterile vaginal exam was fingertip thick and -3 station they are short for she was discharged home.  Later that night she presented with contractions and vaginal bleeding and was admitted.  Her cervical exam at that time was a bulging bag and they were not able to reach behind the back to feel the cervical dilatation.  Bedside ultrasound was performed did not find that the fetus was in a footling breech position with hourglassing membranes and the internal os was dilated 1.43 cm.  She was kenn every 2 to 3 minutes and was treated with expectant management based on her gestational age and eminent delivery.    Prior in the pregnancy she had had significant hyperemesis and had multiple hospitalization and IV infusions.    Discussion:  Discussed with the patient that her presentation with the short funneling cervix and later progression into labor is consistent with an incompetent cervix.  We discussed  different treatment methods for incompetent cervix.  Specifically we discussed prophylactic vaginal Bell cerclage between 12 and 14 weeks, serial cervical length ultrasound and ultrasound indicated placement of cervical cerclage, and we discussed prepregnancy placement of an abdominal cerclage.  We discussed success rates of each of these treatment plans as well as risks.    Cervical cerclage refers to a variety of surgical procedures in which sutures, wires, or synthetic tape are used to reinforce the cervix. These procedures are intended to mechanically increase the tensile strength of the cervix, thereby reducing the occurrence of adverse  events associated with cervical insufficiency (ie, relatively painless cervical changes that occur in the second trimester and result in recurrent pregnancy loss).     Not all women are good candidates for cervical cerclage; the major contraindications are fetal anomaly incompatible with life, intrauterine infection, active bleeding, active  labor, premature rupture of membranes, and fetal demise. The presence of fetal membranes prolapsing through the external cervical os is a relative contraindication to the procedure because the risk of iatrogenic rupture of the membranes in this setting may exceed 50 percent.  Several techniques (eg, Trendelenburg position, back-filling the maternal bladder, decompression amniocentesis) have been used to make the fetal membranes retract prior to cerclage placement, with variable results     The lower and upper limits of gestational age for performing the procedure are not well defined. Waiting at least until the end of the first trimester also permits evaluation for some fetal anomalies, first trimester aneuploidy screening, or chorionic villus sampling prior to the procedure, if indicated.    Most cerclages are placed via a vaginal approach. The transabdominal approach is more invasive, but allows higher placement since  the transabdominal cerclage can be placed at the cervicoisthmic portion of the uterus, while transvaginal cerclages often end up distal to the internal os. In either case, the object is to reinforce the cervix at the level of the internal os. Lengthening of the cervix is a secondary effect. The length of cervix distal to the cerclage (ie, cerclage to external os) may not be an important factor in determining subsequent pregnancy outcome, but the length of closed cervix proximal to the cerclage appears to be predictive: a length ?10 mm is desirable     Cervical cerclage is the conventional treatment for cervical insufficiency, despite the paucity of data from randomized trials proving its efficacy. Most case series report a viable birth rate of 70 to 90 percent after elective cerclage, as compared with 10 to 30 percent prior to the procedure. However, using patients as their own controls (ie, pregnancy success rate is compared before and after cerclage) is subject to bias since changes in the patient and her management other than cerclage may have accounted for the higher rate of success in the subsequent pregnancy. In fact, trials in which women were randomly assigned to undergo cerclage or no cerclage report much higher live birth rates in the untreated group than observed in historic controls.    The timing of cerclage also affects outcome. There is universal agreement that emergency cerclage performed in the presence of advanced cervical changes and prolapsed membranes has the worst outcome. It should be appreciated that there is significant risk that an emergency procedure may convert a previable birth to a very low birthweight premature birth (24 to 27 weeks) with the potential for serious long-term neurodevelopmental disability.    Transabdominal placement of a cerclage at the cervicoisthmic junction appears to be a safe and effective procedure for reducing the incidence of spontaneous pregnancy loss in  selected patients with cervical insufficiency. Potential advantages of transabdominal over transvaginal cerclage are more proximal placement of the stitch (at the level of the internal os), decreased risk of suture migration, absence of a foreign body in the vagina that could promote infection, and the ability to leave the suture in place for future pregnancies. A disadvantage of this approach is the potential need for two laparotomies during pregnancy (one to place the cerclage and potentially another to remove it).    There are no studies comparing the outcome of transabdominal and transvaginal cerclage in similar populations of patients. Transabdominal cerclage is a more morbid procedure than transvaginal cerclage. It usually requires a laparotomy for placement and delivery by . For these reasons, most experts recommend reserving the transabdominal approach for women with cervical insufficiency who have either failed two or more previous transvaginal cerclages or in whom a transvaginal cerclage is technically impossible to perform due to extreme shortening, scarring, or laceration of the cervix.    Whether to remove the cerclage if PPROM occurs is a matter of debate. One concern is that removal of the cerclage will lead to earlier delivery; however, retention of the foreign body may increase the risk of infectious morbidity. Several observational studies have addressed the management of such patients, with inconsistent results. The gestational age at PPROM was the most important determinant of  outcome. Based on the available, limited data and our own clinical experience, we remove the cerclage in women with PPROM if (1) there is any evidence of chorioamnionitis or (2) the pregnancy is at least 32 weeks of gestation. In the absence of clinically apparent infection, we feel the possible increased risk of premature delivery with cerclage removal before 32 weeks outweighs the possible increased risk  of ascending infection if the cerclage is left in place.    After our discussion Cindy call me that she is leaning towards having a vaginal cerclage placed after the first trimester is complete.        High BMI -she is currently trying to modify her lifestyle to improve her diet and to focus on movement.  She does like to walk and generally take walks if the weather is good.  Since her delivery, she has reduced fried foods.  She has not had red meat in a number of years and rarely eats poultry or other meat products.  She is interested in tries to learn about nutrition.  We discussed briefly different macronutrients and the importance of adequate protein.  We discussed good plant-based sources of protein as well as dairy and egg sources.  She does not eat much in the way of cheese and she does not eat seafood of any sort.    We discussed the current recommendations for limited gestational weight gain in pregnancy for overweight and obese women.  The [place] [employer] currently recommends that women keep gestational weight gain to between 8-18 lbs.  We discussed the role of mild to moderate exercise, healthy food choices and appropriate portions sized to help achieve this goal.  Excess weight gain is associated with higher rates of gestational diabetes, hypertensive complications, fetal macrosomia and delivery complications.  Women with weight loss or insufficient weight gain have higher rates of small for gestational age infants.    Cindy had her questions answered to her satisfaction.  She seems optimistic about a successful pregnancy with a cervical cerclage in the future.          OB ULTRASOUND REPORT   See imaging tab for complete ultrasound report     Fetal Heart Rate: Present 167 bpm  Fetal Presentation: Transverse Left  Amniotic fluid MVP: WNL  Placental Location: Posterior       FIRST TRIMESTER SCREENING:    The CRL is consistent with the gestational age.    Fetus: arms and legs seen suboptimally.  Fetal head/skull and abdomen appeared normal. Stomach and bladder seen.   Uterus and adnexa appeared normal      NIPT pending.  IMPRESSION:   1. IUP @  11w2d  2. Scan consistent with dates  3. No fetal structural abnormalities seen but limited by early gestational age  4.  Incompetent cervix based on her recent obstetrical history  5.  Advanced maternal age  6.  History of 2 prior D&Cs    RECOMMENDATIONS:    Weekly NST at 36wks  Growth US at 32wks  Level 2 US at 20wks  Cerclage at 12-14wks ( we will schedule)  F/u one week after cerclage for CL    Total time spent 45 minutes this calendar day which includes preparing to see the patient including chart review, obtaining and/or reviewing additional medical history, performing a physical exam and evaluation, documenting clinical information in the electronic medical record, independently interpreting results, counseling the patient, communicating results to the patient/family/caregiver and coordinating care.     Case discussed with patient who demonstrated understanding and agreement with plan.     Thank you for allowing me to participate in the care of this patient.  Please feel free to contact me with any questions.    Reymundo Alamo D.O.  Maternal Fetal Medicine        Note to patient and family:  The 21st Century Cures Act makes medical notes available to patients in the interest of transparency.  However, please be advised that this is a medical document.  It is intended as a peer to peer communication.  It is written in medical language and may contain abbreviations or verbiage that are technical and unfamiliar.  It may appear blunt or direct.  Medical documents are intended to carry relevant information, facts as evident, and the clinical opinion of the practitioner.      Pt here for 1st Trimester Ultrasound/Hx of Incompetent Cervix  Pt denies complaints.